# Patient Record
Sex: MALE | Race: WHITE | Employment: OTHER | URBAN - METROPOLITAN AREA
[De-identification: names, ages, dates, MRNs, and addresses within clinical notes are randomized per-mention and may not be internally consistent; named-entity substitution may affect disease eponyms.]

---

## 2017-03-13 PROBLEM — M25.511 ACUTE PAIN OF RIGHT SHOULDER: Status: ACTIVE | Noted: 2017-03-13

## 2018-01-03 PROBLEM — I35.1 AORTIC VALVE REGURGITATION: Status: ACTIVE | Noted: 2018-01-03

## 2018-01-03 PROBLEM — I20.0 UNSTABLE ANGINA (HCC): Status: ACTIVE | Noted: 2018-01-03

## 2018-01-03 PROBLEM — I20.9 ANGINA PECTORIS (HCC): Status: ACTIVE | Noted: 2018-01-03

## 2018-01-03 PROBLEM — I10 HYPERTENSION: Status: ACTIVE | Noted: 2018-01-03

## 2018-01-03 PROBLEM — I34.0 MITRAL VALVE REGURGITATION: Status: ACTIVE | Noted: 2018-01-03

## 2018-01-03 PROBLEM — Z95.5 HISTORY OF CORONARY ARTERY STENT PLACEMENT: Status: ACTIVE | Noted: 2018-01-03

## 2018-01-04 PROBLEM — M25.511 ACUTE PAIN OF RIGHT SHOULDER: Status: RESOLVED | Noted: 2017-03-13 | Resolved: 2018-01-04

## 2018-01-04 PROBLEM — D64.9 CHRONIC ANEMIA: Chronic | Status: ACTIVE | Noted: 2018-01-04

## 2018-01-04 PROBLEM — I21.4 NON-STEMI (NON-ST ELEVATED MYOCARDIAL INFARCTION) (HCC): Status: ACTIVE | Noted: 2018-01-04

## 2018-01-04 PROBLEM — N18.30 STAGE 3 CHRONIC KIDNEY DISEASE (HCC): Chronic | Status: ACTIVE | Noted: 2018-01-04

## 2018-01-04 PROBLEM — I21.4 NSTEMI (NON-ST ELEVATED MYOCARDIAL INFARCTION) (HCC): Status: ACTIVE | Noted: 2018-01-04

## 2018-01-04 PROBLEM — I20.0 UNSTABLE ANGINA (HCC): Status: RESOLVED | Noted: 2018-01-03 | Resolved: 2018-01-04

## 2018-01-05 PROBLEM — I21.4 NON-STEMI (NON-ST ELEVATED MYOCARDIAL INFARCTION) (HCC): Status: RESOLVED | Noted: 2018-01-04 | Resolved: 2018-01-05

## 2018-01-05 PROBLEM — N18.2 CKD (CHRONIC KIDNEY DISEASE) STAGE 2, GFR 60-89 ML/MIN: Chronic | Status: RESOLVED | Noted: 2018-01-05 | Resolved: 2018-01-05

## 2018-01-05 PROBLEM — Z95.5 HISTORY OF CORONARY ARTERY STENT PLACEMENT: Status: RESOLVED | Noted: 2018-01-03 | Resolved: 2018-01-05

## 2018-01-05 PROBLEM — E66.811 OBESITY (BMI 30.0-34.9): Chronic | Status: ACTIVE | Noted: 2018-01-05

## 2018-01-05 PROBLEM — I10 HYPERTENSION: Status: RESOLVED | Noted: 2018-01-03 | Resolved: 2018-01-05

## 2018-01-05 PROBLEM — E66.9 OBESITY (BMI 30.0-34.9): Chronic | Status: ACTIVE | Noted: 2018-01-05

## 2018-01-05 PROBLEM — N18.30 STAGE 3 CHRONIC KIDNEY DISEASE (HCC): Chronic | Status: RESOLVED | Noted: 2018-01-04 | Resolved: 2018-01-05

## 2018-01-05 PROBLEM — N18.30 CKD (CHRONIC KIDNEY DISEASE) STAGE 3, GFR 30-59 ML/MIN (HCC): Chronic | Status: ACTIVE | Noted: 2018-01-05

## 2018-01-05 PROBLEM — I10 ESSENTIAL HYPERTENSION: Chronic | Status: ACTIVE | Noted: 2018-01-05

## 2018-01-05 PROBLEM — E78.5 HYPERLIPIDEMIA LDL GOAL <100: Chronic | Status: ACTIVE | Noted: 2018-01-05

## 2018-01-05 PROBLEM — I25.10 CAD (CORONARY ARTERY DISEASE), NATIVE CORONARY ARTERY: Chronic | Status: ACTIVE | Noted: 2018-01-05

## 2018-01-05 PROBLEM — N18.2 CKD (CHRONIC KIDNEY DISEASE) STAGE 2, GFR 60-89 ML/MIN: Chronic | Status: ACTIVE | Noted: 2018-01-05

## 2019-09-26 ENCOUNTER — OFFICE VISIT (OUTPATIENT)
Dept: ORTHOPEDIC SURGERY | Age: 74
End: 2019-09-26
Payer: MEDICARE

## 2019-09-26 VITALS
HEIGHT: 71 IN | SYSTOLIC BLOOD PRESSURE: 136 MMHG | HEART RATE: 64 BPM | WEIGHT: 247 LBS | TEMPERATURE: 97.5 F | DIASTOLIC BLOOD PRESSURE: 78 MMHG | BODY MASS INDEX: 34.58 KG/M2

## 2019-09-26 DIAGNOSIS — S86.811A STRAIN OF CALF MUSCLE, RIGHT, INITIAL ENCOUNTER: Primary | ICD-10-CM

## 2019-09-26 DIAGNOSIS — Z96.653 HX OF TOTAL KNEE ARTHROPLASTY, BILATERAL: ICD-10-CM

## 2019-09-26 PROBLEM — S86.819A STRAIN OF CALF MUSCLE: Status: ACTIVE | Noted: 2019-09-26

## 2019-09-26 PROCEDURE — 1123F ACP DISCUSS/DSCN MKR DOCD: CPT | Performed by: ORTHOPAEDIC SURGERY

## 2019-09-26 PROCEDURE — 3017F COLORECTAL CA SCREEN DOC REV: CPT | Performed by: ORTHOPAEDIC SURGERY

## 2019-09-26 PROCEDURE — 99213 OFFICE O/P EST LOW 20 MIN: CPT | Performed by: ORTHOPAEDIC SURGERY

## 2019-09-26 PROCEDURE — G8417 CALC BMI ABV UP PARAM F/U: HCPCS | Performed by: ORTHOPAEDIC SURGERY

## 2019-09-26 PROCEDURE — 1036F TOBACCO NON-USER: CPT | Performed by: ORTHOPAEDIC SURGERY

## 2019-09-26 PROCEDURE — G8598 ASA/ANTIPLAT THER USED: HCPCS | Performed by: ORTHOPAEDIC SURGERY

## 2019-09-26 PROCEDURE — 4040F PNEUMOC VAC/ADMIN/RCVD: CPT | Performed by: ORTHOPAEDIC SURGERY

## 2019-09-26 PROCEDURE — G8427 DOCREV CUR MEDS BY ELIG CLIN: HCPCS | Performed by: ORTHOPAEDIC SURGERY

## 2019-09-26 RX ORDER — LEVOTHYROXINE SODIUM 0.05 MG/1
50 TABLET ORAL DAILY
COMMUNITY

## 2019-09-26 NOTE — PROGRESS NOTES
[facility] CARDIAC SURGERY  06/15/2001    cabg x 2, DR Nolan Hanna, SVG-RCA    COLONOSCOPY      CORONARY ANGIOPLASTY WITH STENT PLACEMENT  06/2016    LUMBAR FUSION  02/2015    L4-S1   DR. ALVARO ACUNA [facility]    MALIGNANT SKIN LESION EXCISION Left 02/28/2017    VORTEX SCALP    OTHER SURGICAL HISTORY      lumbar puncture    TOTAL KNEE ARTHROPLASTY Right 12/2007    Right TKA. Dr. Mason Rater Left 07/2010    Left TKA.     UPPER GASTROINTESTINAL ENDOSCOPY  2007       Current Outpatient Medications   Medication Sig Dispense Refill    levothyroxine (SYNTHROID) 50 MCG tablet Take 50 mcg by mouth Daily      rosuvastatin (CRESTOR) 10 MG tablet Take 10 mg by mouth daily       clopidogrel (PLAVIX) 75 MG tablet Take 1 tablet by mouth daily 30 tablet 0    gabapentin (NEURONTIN) 300 MG capsule Take 300 mg by mouth 4 times daily.  metoprolol succinate (TOPROL XL) 25 MG extended release tablet Take 25 mg by mouth every morning      FLUoxetine (PROZAC) 10 MG tablet Take 10 mg by mouth every morning       LORazepam (ATIVAN) 0.5 MG tablet Take 0.25-0.5 mg by mouth 3 times daily as needed for Anxiety .  methotrexate 2.5 MG tablet Take 15 mg by mouth once a week Monday      leucovorin (WELLCOVORIN) 5 MG tablet Take 10 mg by mouth once a week. tuesday       omeprazole (PRILOSEC) 20 MG capsule Take 20 mg by mouth every morning       aspirin 81 MG EC tablet Take 81 mg by mouth every morning        No current facility-administered medications for this visit.         Allergies   Allergen Reactions    Amoxicillin Swelling and Other (See Comments)     Weakness  Patient took Amoxicillin and Flagyl at the same time November 2017 and was unsure which medication caused his reaction    Flagyl [Metronidazole] Swelling and Other (See Comments)     Weakness  Patient took Amoxicillin and Flagyl at the same time November 2017 and was unsure which medication caused his reaction    Pcn

## 2019-12-09 ENCOUNTER — OFFICE VISIT (OUTPATIENT)
Dept: ORTHOPEDIC SURGERY | Age: 74
End: 2019-12-09
Payer: MEDICARE

## 2019-12-09 VITALS
WEIGHT: 245 LBS | TEMPERATURE: 98.4 F | DIASTOLIC BLOOD PRESSURE: 77 MMHG | SYSTOLIC BLOOD PRESSURE: 138 MMHG | HEIGHT: 71 IN | BODY MASS INDEX: 34.3 KG/M2 | HEART RATE: 65 BPM

## 2019-12-09 DIAGNOSIS — M25.562 LEFT KNEE PAIN, UNSPECIFIED CHRONICITY: ICD-10-CM

## 2019-12-09 DIAGNOSIS — Z96.653 HX OF TOTAL KNEE ARTHROPLASTY, BILATERAL: Primary | ICD-10-CM

## 2019-12-09 DIAGNOSIS — M67.432 GANGLION CYST OF VOLAR ASPECT OF LEFT WRIST: ICD-10-CM

## 2019-12-09 PROCEDURE — G8598 ASA/ANTIPLAT THER USED: HCPCS | Performed by: ORTHOPAEDIC SURGERY

## 2019-12-09 PROCEDURE — 1036F TOBACCO NON-USER: CPT | Performed by: ORTHOPAEDIC SURGERY

## 2019-12-09 PROCEDURE — G8427 DOCREV CUR MEDS BY ELIG CLIN: HCPCS | Performed by: ORTHOPAEDIC SURGERY

## 2019-12-09 PROCEDURE — 4040F PNEUMOC VAC/ADMIN/RCVD: CPT | Performed by: ORTHOPAEDIC SURGERY

## 2019-12-09 PROCEDURE — 99214 OFFICE O/P EST MOD 30 MIN: CPT | Performed by: ORTHOPAEDIC SURGERY

## 2019-12-09 PROCEDURE — 3017F COLORECTAL CA SCREEN DOC REV: CPT | Performed by: ORTHOPAEDIC SURGERY

## 2019-12-09 PROCEDURE — 20612 ASPIRATE/INJ GANGLION CYST: CPT | Performed by: ORTHOPAEDIC SURGERY

## 2019-12-09 PROCEDURE — G8484 FLU IMMUNIZE NO ADMIN: HCPCS | Performed by: ORTHOPAEDIC SURGERY

## 2019-12-09 PROCEDURE — G8417 CALC BMI ABV UP PARAM F/U: HCPCS | Performed by: ORTHOPAEDIC SURGERY

## 2019-12-09 PROCEDURE — 1123F ACP DISCUSS/DSCN MKR DOCD: CPT | Performed by: ORTHOPAEDIC SURGERY

## 2019-12-09 RX ORDER — FLUOROURACIL 50 MG/G
CREAM TOPICAL
Refills: 0 | COMMUNITY
Start: 2019-11-22 | End: 2022-04-18 | Stop reason: ALTCHOICE

## 2020-01-20 ENCOUNTER — OFFICE VISIT (OUTPATIENT)
Dept: ORTHOPEDIC SURGERY | Age: 75
End: 2020-01-20
Payer: MEDICARE

## 2020-01-20 VITALS — BODY MASS INDEX: 34.3 KG/M2 | HEIGHT: 71 IN | WEIGHT: 245 LBS

## 2020-01-20 PROCEDURE — 3017F COLORECTAL CA SCREEN DOC REV: CPT | Performed by: ORTHOPAEDIC SURGERY

## 2020-01-20 PROCEDURE — G8427 DOCREV CUR MEDS BY ELIG CLIN: HCPCS | Performed by: ORTHOPAEDIC SURGERY

## 2020-01-20 PROCEDURE — G8484 FLU IMMUNIZE NO ADMIN: HCPCS | Performed by: ORTHOPAEDIC SURGERY

## 2020-01-20 PROCEDURE — 4040F PNEUMOC VAC/ADMIN/RCVD: CPT | Performed by: ORTHOPAEDIC SURGERY

## 2020-01-20 PROCEDURE — 99213 OFFICE O/P EST LOW 20 MIN: CPT | Performed by: ORTHOPAEDIC SURGERY

## 2020-01-20 PROCEDURE — 1123F ACP DISCUSS/DSCN MKR DOCD: CPT | Performed by: ORTHOPAEDIC SURGERY

## 2020-01-20 PROCEDURE — 1036F TOBACCO NON-USER: CPT | Performed by: ORTHOPAEDIC SURGERY

## 2020-01-20 PROCEDURE — G8417 CALC BMI ABV UP PARAM F/U: HCPCS | Performed by: ORTHOPAEDIC SURGERY

## 2020-01-20 NOTE — PROGRESS NOTES
Chief Complaint:   Chief Complaint   Patient presents with    Knee Pain     F/u left knee. Patient continues to have pain behind left knee, pain is radiating into calf. HPI   Follow-up left posterior knee and calf pain with walking. Again patient has a history of total knee arthroplasty by Dr. Taj Saleh. Patient does not describe pain that occurs with walking distances to suggest claudication and seems more with direct exertion. He has been trying home exercises, has not had physical therapy. Patient Active Problem List   Diagnosis    Mitral valve regurgitation    Aortic valve regurgitation    NSTEMI [facility]non-ST elevated [facility] infarction) [facility]    Chronic anemia    Hyperlipidemia LDL goal <100    CAD (coronary artery disease), native coronary artery    Essential hypertension    CKD (chronic kidney disease) stage 3, GFR 30-59 ml/min ([facility])    Obesity (BMI 30.0-34. 9)    Hx of total knee arthroplasty, bilateral    Strain of calf muscle       Past Medical History:   Diagnosis Date    Anxiety     Arthritis     rheumatoid arthritis    ASHD (arteriosclerotic heart disease)     Chronic anemia     Chronic kidney disease, stage III (moderate) ([facility])     Depression     GERD (gastroesophageal reflux disease)     Hyperlipidemia     MI [facility]myocardial infarction) [facility] [address]     NSTEMI 7/2000    Other ulcerative colitis     BY BIOPSY    Psoriatic arthritis ([facility] [address]     Sleep apnea        Past Surgical History:   Procedure Laterality Date    [facility] Kwon. [facility]    CARDIAC CATHETERIZATION  01/04/2018    Dr. Elvia Jaquez  06/15/2001    cabg x 2, DR Michel Silverio, SVG-RCA    COLONOSCOPY      CORONARY ANGIOPLASTY WITH STENT PLACEMENT  06/2016    LUMBAR FUSION  02/2015    L4-S1   DR. ALVARO ACUNA [facility]    MALIGNANT SKIN LESION EXCISION Left 02/28/2017    VORTEX SCALP    OTHER SURGICAL HISTORY      lumbar puncture    TOTAL KNEE ARTHROPLASTY Right 12/2007    Right TKA. Dr. Albania Patino Left 07/2010    Left TKA.     UPPER GASTROINTESTINAL ENDOSCOPY  2007       Current Outpatient Medications   Medication Sig Dispense Refill    fluorouracil (EFUDEX) 5 % cream APPLY TO AFFECTED AREA TWICE A DAY [id number]  0    levothyroxine (SYNTHROID) 50 MCG tablet Take 50 mcg by mouth Daily      rosuvastatin (CRESTOR) 10 MG tablet Take 10 mg by mouth daily       clopidogrel (PLAVIX) 75 MG tablet Take 1 tablet by mouth daily 30 tablet 0    gabapentin (NEURONTIN) 300 MG capsule Take 300 mg by mouth 4 times daily.  metoprolol succinate (TOPROL XL) 25 MG extended release tablet Take 25 mg by mouth every morning      FLUoxetine (PROZAC) 10 MG tablet Take 10 mg by mouth every morning       LORazepam (ATIVAN) 0.5 MG tablet Take 0.25-0.5 mg by mouth 3 times daily as needed for Anxiety .  methotrexate 2.5 MG tablet Take 15 mg by mouth once a week Monday      leucovorin (WELLCOVORIN) 5 MG tablet Take 10 mg by mouth once a week. tuesday       omeprazole (PRILOSEC) 20 MG capsule Take 20 mg by mouth every morning       aspirin 81 MG EC tablet Take 81 mg by mouth every morning        No current facility-administered medications for this visit.         Allergies   Allergen Reactions    Amoxicillin Swelling and Other (See Comments)     Weakness  Patient took Amoxicillin and Flagyl at the same time November 2017 and was unsure which medication caused his reaction    Flagyl [Metronidazole] Swelling and Other (See Comments)     Weakness  Patient took Amoxicillin and Flagyl at the same time November 2017 and was unsure which medication caused his reaction    Pcn [Penicillins] Swelling       Social History     Socioeconomic History    Marital status:      Spouse name: None    Number of children: None    Years of education: None    Highest education level: None   Occupational History    Occupation: retired- manager     Comment: [employer]

## 2020-11-23 PROBLEM — I25.10 ARTERIOSCLEROSIS OF CORONARY ARTERY: Status: ACTIVE | Noted: 2018-01-05

## 2021-04-28 ENCOUNTER — OFFICE VISIT (OUTPATIENT)
Dept: ORTHOPEDIC SURGERY | Age: 76
End: 2021-04-28
Payer: MEDICARE

## 2021-04-28 VITALS — BODY MASS INDEX: 31.83 KG/M2 | WEIGHT: 235 LBS | HEIGHT: 72 IN

## 2021-04-28 DIAGNOSIS — M75.121 COMPLETE TEAR OF RIGHT ROTATOR CUFF, UNSPECIFIED WHETHER TRAUMATIC: ICD-10-CM

## 2021-04-28 DIAGNOSIS — M75.41 IMPINGEMENT SYNDROME OF RIGHT SHOULDER: ICD-10-CM

## 2021-04-28 DIAGNOSIS — S70.01XA CONTUSION OF RIGHT HIP, INITIAL ENCOUNTER: ICD-10-CM

## 2021-04-28 DIAGNOSIS — S49.91XA SHOULDER INJURY, RIGHT, INITIAL ENCOUNTER: Primary | ICD-10-CM

## 2021-04-28 PROCEDURE — 1123F ACP DISCUSS/DSCN MKR DOCD: CPT | Performed by: ORTHOPAEDIC SURGERY

## 2021-04-28 PROCEDURE — 20610 DRAIN/INJ JOINT/BURSA W/O US: CPT | Performed by: ORTHOPAEDIC SURGERY

## 2021-04-28 PROCEDURE — 4040F PNEUMOC VAC/ADMIN/RCVD: CPT | Performed by: ORTHOPAEDIC SURGERY

## 2021-04-28 PROCEDURE — G8427 DOCREV CUR MEDS BY ELIG CLIN: HCPCS | Performed by: ORTHOPAEDIC SURGERY

## 2021-04-28 PROCEDURE — 1036F TOBACCO NON-USER: CPT | Performed by: ORTHOPAEDIC SURGERY

## 2021-04-28 PROCEDURE — G8417 CALC BMI ABV UP PARAM F/U: HCPCS | Performed by: ORTHOPAEDIC SURGERY

## 2021-04-28 PROCEDURE — 99213 OFFICE O/P EST LOW 20 MIN: CPT | Performed by: ORTHOPAEDIC SURGERY

## 2021-04-28 RX ORDER — BUPIVACAINE HYDROCHLORIDE 2.5 MG/ML
2 INJECTION, SOLUTION INFILTRATION; PERINEURAL ONCE
Status: COMPLETED | OUTPATIENT
Start: 2021-04-28 | End: 2021-04-28

## 2021-04-28 RX ORDER — TRIAMCINOLONE ACETONIDE 40 MG/ML
40 INJECTION, SUSPENSION INTRA-ARTICULAR; INTRAMUSCULAR ONCE
Status: COMPLETED | OUTPATIENT
Start: 2021-04-28 | End: 2021-04-28

## 2021-04-28 RX ORDER — ZINC GLUCONATE 50 MG
50 TABLET ORAL DAILY
COMMUNITY

## 2021-04-28 RX ORDER — ACETAMINOPHEN/DIPHENHYDRAMINE 500MG-25MG
2 TABLET ORAL NIGHTLY PRN
COMMUNITY

## 2021-04-28 RX ADMIN — TRIAMCINOLONE ACETONIDE 40 MG: 40 INJECTION, SUSPENSION INTRA-ARTICULAR; INTRAMUSCULAR at 15:14

## 2021-04-28 RX ADMIN — BUPIVACAINE HYDROCHLORIDE 5 MG: 2.5 INJECTION, SOLUTION INFILTRATION; PERINEURAL at 15:14

## 2021-04-28 NOTE — PROGRESS NOTES
Chief Complaint:   Chief Complaint   Patient presents with    Shoulder Injury     Rt shoulder injury 4/15/2021. No X-rays  Tripped on uneven ground, fell on right side, landing in gravel injurying Rt shoulder    Hip Injury     Injured right hip in same fall. No X-rays       HPI 65-year-old man fell while on vacation in the [facility] 4/15/2021 onto his right shoulder and right hip. Did not have too much hip pain but noticed a large area of bruising on the lateral aspect of the right hip. He has had some persistent right shoulder pain difficulty elevating the arm pain at night. He was evaluated in the past with shoulder x-rays in 2017 and had physical therapy for it. Patient Active Problem List   Diagnosis    Mitral valve regurgitation    Aortic valve regurgitation    NSTEMI (non-ST elevated myocardial infarction) (H[facility])    Chronic anemia    Hyperlipidemia LDL goal <100    Arteriosclerosis of coronary artery    Essential hypertension    CKD (chronic kidney disease) stage 3, GFR 30-59 ml/min    Obesity (BMI 30.0-34. 9)    Hx of total knee arthroplasty, bilateral    Strain of calf muscle       Past Medical History:   Diagnosis Date    Anxiety     Arthritis     rheumatoid arthritis    ASHD (arteriosclerotic heart disease)     Chronic anemia     Chronic kidney disease, stage III (moderate)     Depression     GERD (gastroesophageal reflux disease)     Hyperlipidemia     MI (myocardial infarction) ([address]     NSTEMI 7/2000    Other ulcerative colitis     BY BIOPSY    Psoriatic arthritis ([address]     Sleep apnea        Past Surgical History:   Procedure Laterality Date    Phillip Painter. [facility]    CARDIAC CATHETERIZATION  01/04/2018    Dr. Veronica Mckay  06/15/2001    cabg x 2, DR Aj Miramontes, SVG-RCA    COLONOSCOPY      CORONARY ANGIOPLASTY WITH STENT PLACEMENT  06/2016    LUMBAR FUSION  02/2015    L4-S1   [facility] Amox[facility] and Flagyl at the same time 2017 and was unsure which medication caused his reaction    Pcn [Penicillins] Swelling       Social History     Socioeconomic History    Marital status:      Spouse name: None    Number of children: None    Years of education: None    Highest education level: None   Occupational History    Occupation: retired- manager     Comment: [email or web address]   Social Needs    Financial resource strain: None    Food insecurity     Worry: None     Inability: None    Transportation needs     Medical: None     Non-medical: None   Tobacco Use    Smoking status: Former Smoker     Packs/day: 1.00     Years: 15.00     Pack years: 15.00     Types: Cigarettes     Start date:      Quit date: 1978     Years since quittin.11    Smokeless tobacco: Never Used   Substance and Sexual Activity    Alcohol use: Yes     Alcohol/week: 5.0 standard drinks     Types: 5 Shots of liquor per week     Comment: 1-2 beers a day    Drug use: No    Sexual activity: Not Currently   Lifestyle    Physical activity     Days per week: None     Minutes per session: None    Stress: None   Relationships    Social connections     Talks on phone: None     Gets together: None     Attends Roman [employer] service: None     Active member of club or organization: None     Attends meetings of clubs or organizations: None     Relationship status: None    Intimate partner violence     Fear of current or ex partner: None     Emotionally abused: None     Physically abused: None     Forced sexual activity: None   Other Topics Concern    None   Social History Narrative    None       Family History   Problem Relation Age of Onset    Other Mother         sepsis    Diabetes Father     Heart Disease Father          Review of Systems   No fever, chills, or other constitutionalsymptoms. No numbness or other neuro symptoms. No chest pain. No dyspnea. [unfilled]   No acute distress. Stands and walks independently. No limp. Negative Trendelenburg test.  No pain on right hip rotation. The lateral aspect of the right hip shows no residual ecchymosis no tenderness no palpable fluid accumulation. Right shoulder has some supraspinatus atrophy evident. Some tenderness over the acromion. He can flex to 95 degrees, abduct to 90 degrees, internal rotation to the posterior ilium. Supraspinatus strength testing 4/5. Physical Exam    Patient is alert and oriented. Well-developed well-nourished. Pupils equal and reactive. Scleraeanicteric. Neck supple  Lungs clear. Cardiac rate and rhythm regular. Abdomen soft and nontender. Skin warm and dry. XRAY: X-ray today AP and lateral views right shoulder. Mild glenohumeral degenerative changes. There is significant narrowing of the subacromial index with acromioclavicular degenerative changes. Slight cystic changes in the greater tuberosity of the right humerus. 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 ASSESSMENT/PLAN:    Sravan Solano was seen today for shoulder injury and hip injury. Diagnoses and all orders for this visit:    Shoulder injury, right, initial encounter  -     XR SHOULDER RIGHT (MIN 2 VIEWS); Future    Contusion of right hip, initial encounter    Complete tear of right rotator cuff, unspecified whether traumatic  -     Amb External Referral To Physical Therapy    Impingement syndrome of right shoulder  -     CO ARTHROCENTESIS ASPIR&/INJ MAJOR JT/BURSA W/O US    Other orders  -     triamcinolone acetonide (KENALOG-40) injection 40 mg  -     bupivacaine (MARCAINE) 0.25 % injection 5 mg    Findings and images reviewed with the patient. His cuff tear is likely chronic. Suggested he resume physical therapy and also offered steroid injection which she wished to have today. The right hip appears to have resolved contusion with no clinical evidence to suggest fracture.     Treatment options, risks and limits explained. After alcohol prep, right subacromial shoulder injection with triamcinolone 40mg and 0.25% marcaine 3ml  given today. Well tolerated. Return in about 6 weeks (around 6/9/2021).        Alyssa Calhoun MD    4/28/2021  2:57 PM

## 2022-02-24 ENCOUNTER — HOSPITAL ENCOUNTER (OUTPATIENT)
Age: 77
Discharge: HOME | End: 2022-02-26

## 2022-02-24 PROCEDURE — 88305 TISSUE EXAM BY PATHOLOGIST: CPT

## 2022-04-17 ENCOUNTER — APPOINTMENT (OUTPATIENT)
Dept: ULTRASOUND IMAGING | Age: 77
End: 2022-04-17
Payer: MEDICARE

## 2022-04-17 ENCOUNTER — HOSPITAL ENCOUNTER (EMERGENCY)
Age: 77
Discharge: ANOTHER HOSPITAL | End: 2022-04-18
Attending: STUDENT IN AN ORGANIZED HEALTH CARE EDUCATION/TRAINING PROGRAM
Payer: MEDICARE

## 2022-04-17 ENCOUNTER — APPOINTMENT (OUTPATIENT)
Dept: CT IMAGING | Age: 77
End: 2022-04-17
Payer: MEDICARE

## 2022-04-17 VITALS
TEMPERATURE: 97.9 F | BODY MASS INDEX: 32.51 KG/M2 | DIASTOLIC BLOOD PRESSURE: 70 MMHG | HEART RATE: 65 BPM | HEIGHT: 72 IN | OXYGEN SATURATION: 95 % | WEIGHT: 240 LBS | SYSTOLIC BLOOD PRESSURE: 126 MMHG | RESPIRATION RATE: 16 BRPM

## 2022-04-17 DIAGNOSIS — T14.8XXA HEMATOMA: Primary | ICD-10-CM

## 2022-04-17 LAB
ANION GAP SERPL CALCULATED.3IONS-SCNC: 8 MMOL/L (ref 7–16)
APTT: 28.4 SEC (ref 24.5–35.1)
ATYPICAL LYMPHOCYTE RELATIVE PERCENT: 0.9 % (ref 0–4)
BASOPHILS ABSOLUTE: 0 E9/L (ref 0–0.2)
BASOPHILS RELATIVE PERCENT: 0 % (ref 0–2)
BUN BLDV-MCNC: 30 MG/DL (ref 6–23)
C-REACTIVE PROTEIN: 0.3 MG/DL (ref 0–0.4)
CALCIUM SERPL-MCNC: 9.5 MG/DL (ref 8.6–10.2)
CHLORIDE BLD-SCNC: 108 MMOL/L (ref 98–107)
CO2: 27 MMOL/L (ref 22–29)
CREAT SERPL-MCNC: 1.5 MG/DL (ref 0.7–1.2)
EOSINOPHILS ABSOLUTE: 0.4 E9/L (ref 0.05–0.5)
EOSINOPHILS RELATIVE PERCENT: 5.3 % (ref 0–6)
GFR AFRICAN AMERICAN: 55
GFR NON-AFRICAN AMERICAN: 45 ML/MIN/1.73
GLUCOSE BLD-MCNC: 96 MG/DL (ref 74–99)
HCT VFR BLD CALC: 37.6 % (ref 37–54)
HEMOGLOBIN: 12.5 G/DL (ref 12.5–16.5)
INR BLD: 1.1
LYMPHOCYTES ABSOLUTE: 0.68 E9/L (ref 1.5–4)
LYMPHOCYTES RELATIVE PERCENT: 7.9 % (ref 20–42)
MCH RBC QN AUTO: 34.7 PG (ref 26–35)
MCHC RBC AUTO-ENTMCNC: 33.2 % (ref 32–34.5)
MCV RBC AUTO: 104.4 FL (ref 80–99.9)
MONOCYTES ABSOLUTE: 0.6 E9/L (ref 0.1–0.95)
MONOCYTES RELATIVE PERCENT: 8 % (ref 2–12)
NEUTROPHILS ABSOLUTE: 5.85 E9/L (ref 1.8–7.3)
NEUTROPHILS RELATIVE PERCENT: 77.9 % (ref 43–80)
NUCLEATED RED BLOOD CELLS: 0 /100 WBC
PDW BLD-RTO: 13.7 FL (ref 11.5–15)
PLATELET # BLD: 131 E9/L (ref 130–450)
PMV BLD AUTO: 11.3 FL (ref 7–12)
POTASSIUM SERPL-SCNC: 4.4 MMOL/L (ref 3.5–5)
PROTHROMBIN TIME: 11.9 SEC (ref 9.3–12.4)
RBC # BLD: 3.6 E12/L (ref 3.8–5.8)
SEDIMENTATION RATE, ERYTHROCYTE: 8 MM/HR (ref 0–15)
SODIUM BLD-SCNC: 143 MMOL/L (ref 132–146)
WBC # BLD: 7.5 E9/L (ref 4.5–11.5)

## 2022-04-17 PROCEDURE — 86140 C-REACTIVE PROTEIN: CPT

## 2022-04-17 PROCEDURE — 85025 COMPLETE CBC W/AUTO DIFF WBC: CPT

## 2022-04-17 PROCEDURE — 96374 THER/PROPH/DIAG INJ IV PUSH: CPT

## 2022-04-17 PROCEDURE — 96376 TX/PRO/DX INJ SAME DRUG ADON: CPT

## 2022-04-17 PROCEDURE — 80048 BASIC METABOLIC PNL TOTAL CA: CPT

## 2022-04-17 PROCEDURE — 85651 RBC SED RATE NONAUTOMATED: CPT

## 2022-04-17 PROCEDURE — 85610 PROTHROMBIN TIME: CPT

## 2022-04-17 PROCEDURE — 99283 EMERGENCY DEPT VISIT LOW MDM: CPT

## 2022-04-17 PROCEDURE — 75635 CT ANGIO ABDOMINAL ARTERIES: CPT

## 2022-04-17 PROCEDURE — 6360000004 HC RX CONTRAST MEDICATION: Performed by: RADIOLOGY

## 2022-04-17 PROCEDURE — 85730 THROMBOPLASTIN TIME PARTIAL: CPT

## 2022-04-17 PROCEDURE — 93971 EXTREMITY STUDY: CPT

## 2022-04-17 PROCEDURE — 6360000002 HC RX W HCPCS: Performed by: STUDENT IN AN ORGANIZED HEALTH CARE EDUCATION/TRAINING PROGRAM

## 2022-04-17 RX ADMIN — HYDROMORPHONE HYDROCHLORIDE 0.5 MG: 1 INJECTION, SOLUTION INTRAMUSCULAR; INTRAVENOUS; SUBCUTANEOUS at 18:44

## 2022-04-17 RX ADMIN — HYDROMORPHONE HYDROCHLORIDE 1 MG: 1 INJECTION, SOLUTION INTRAMUSCULAR; INTRAVENOUS; SUBCUTANEOUS at 22:58

## 2022-04-17 RX ADMIN — IOPAMIDOL 120 ML: 755 INJECTION, SOLUTION INTRAVENOUS at 18:41

## 2022-04-17 ASSESSMENT — PAIN SCALES - GENERAL
PAINLEVEL_OUTOF10: 7
PAINLEVEL_OUTOF10: 7

## 2022-04-17 NOTE — ED PROVIDER NOTES
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 This started about a week ago. Is gradually getting worse. He has severe pain in the calf. He has numbness and tingling throughout his calf as well as his upper thigh which is chronic due to low back pain and radicular symptomatology. The patient states that today the Pain became severe. He is concerned to potentially have a DVT. He is on Plavix. He denies any numbness or paresthesias in the foot however. Denies any fever chills or myalgias. Nothing symptoms better or worse they are constant duration. He states that he was sitting there today and noted that his calf was very swollen and painful and figured he could have a blood clot so he presents for evaluation. He states that about a week to a week and a half ago he actually twisted on his calf and he felt a pop in his popliteal region. He states he felt some tension at that time but it was minimal and inconsequential per his report and he ignored it. He is unsure if this is related. Does have a history of surgeries in the left Achilles tendon region but none recently. He also has a left TKA that he reports.       Review of Systems:  Review of systems obtained and negative unless stated otherwise above in the HPI.    --------------------------------------------- PAST HISTORY ---------------------------------------------  Past Medical History:  has a past medical history of Anxiety, Arthritis, ASHD (arteriosclerotic heart disease), Chronic anemia, Chronic kidney disease, stage III (moderate) ([facility] [address] Depression, GERD (gastroesophageal reflux disease), Hyperlipidemia, MI (myocardial infarction) ([facility] [address] Other ulcerative colitis, Psoriatic arthritis ([facility] [address] and Sleep apnea. Past Surgical History:  has a past surgical history that includes Cardiac surgery (06/15/2001); other surgical history; Achilles tendon surgery (Left, 1998); Coronary angioplasty with stent (06/2016); Colonoscopy; Upper gastrointestinal endoscopy (2007); malignant skin lesion excision (Left, 02/28/2017); Cardiac catheterization (01/04/2018); Total knee arthroplasty (Right, 12/2007); Total knee arthroplasty (Left, 07/2010); and lumbar fusion (02/2015). Social History:  reports that he quit smoking about 43 years ago. His smoking use included cigarettes. He started smoking about 59 years ago. He has a 15.00 pack-year smoking history. He has never used smokeless tobacco. He reports current alcohol use of about 5.0 standard drinks of alcohol per week. He reports that he does not use drugs. Family History: family history includes Diabetes in his father; Heart Disease in his father; Other in his mother. . Unless otherwise noted, family history is non contributory    The patients home medications have been reviewed. Allergies: Amoxicillin, Flagyl [metronidazole], and Pcn [penicillins]    I have reviewed the past medical history, past surgical history, social history, and family history    ---------------------------------------------------PHYSICAL EXAM--------------------------------------    Constitutional: Appears in no distress  Head: Normocephalic, atraumatic  Eyes: Non-icteric slcera, no conjunctival injection  ENT: Moist mucous membranes,  Neck: Trachea midline, no JVD  Respiratory: Nonlabored respirations. Lungs clear to auscultation bilaterally, no wheezes, rales, or rhonchi. Cardiovascular: Regular rate. Regular rhythm. No murmurs, no gallops, no rubs. Gastrointestinal: Abdomen Soft, Non tender, Non distended. No rebound tenderness, guarding, or rigidity.   Extremities: No lower extremity edema  Genitourinary: No CVA tenderness, no suprapubic tenderness  Musculoskeletal: Moves all extremities, no deformity, there is calf tenderness, significant swelling as compared to the right side, there is minimal to no erythema, there is postsurgical changes along the left Achilles, I am unable to palpate a DP or posterior tibial pulse bilaterally manually. The calf is tense and not easily compressible, there is no pallor, tony thermia of the left lower or right lower extremity. No evidence of overlying cellulitis. I am able to Doppler a left posterior tibial pulse as well as the right posterior tibial pulse that appear equal.  I am unable to locate bilateral dorsalis pedis pulses via Doppler however. There is no pain with passive range of motion but does have some pain with active range of motion in plantarflexion and dorsiflexion of the foot on the left. Skin: Pink, warm, dry without rash. Neurologic: Alert, symmetric facies, no aphasia    -------------------------------------------------- RESULTS -------------------------------------------------  I have personally reviewed all laboratory and imaging results for this patient. Results are listed below.      LABS: (Lab results interpreted by me)  Results for orders placed or performed during the hospital encounter of 98/11/21   Basic Metabolic Panel   Result Value Ref Range    Sodium 143 132 - 146 mmol/L    Potassium 4.4 3.5 - 5.0 mmol/L    Chloride 108 (H) 98 - 107 mmol/L    CO2 27 22 - 29 mmol/L    Anion Gap 8 7 - 16 mmol/L    Glucose 96 74 - 99 mg/dL    BUN 30 (H) 6 - 23 mg/dL    CREATININE 1.5 (H) 0.7 - 1.2 mg/dL    GFR Non-African American 45 >=60 mL/min/1.73    GFR African American 55     Calcium 9.5 8.6 - 10.2 mg/dL   CBC with Auto Differential   Result Value Ref Range    WBC 7.5 4.5 - 11.5 E9/L    RBC 3.60 (L) 3.80 - 5.80 E12/L    Hemoglobin 12.5 12.5 - 16.5 g/dL    Hematocrit 37.6 37.0 - 54.0 %    .4 (H) 80.0 - 99.9 fL    MCH 34.7 26.0 - 35.0 pg    MCHC 33.2 32.0 - 34.5 %    RDW 13.7 11.5 - 15.0 fL    Platelets 480 431 - 768 E9/L    MPV 11.3 7.0 - 12.0 fL    Neutrophils % 77.9 43.0 - 80.0 %    Lymphocytes % 7.9 (L) 20.0 - 42.0 %    Monocytes % 8.0 2.0 - 12.0 %    Eosinophils % 5.3 0.0 - 6.0 %    Basophils % 0.0 0.0 - 2.0 %    Neutrophils Absolute 5.85 1.80 - 7.30 E9/L    Lymphocytes Absolute 0.68 (L) 1.50 - 4.00 E9/L    Monocytes Absolute 0.60 0.10 - 0.95 E9/L    Eosinophils Absolute 0.40 0.05 - 0.50 E9/L    Basophils Absolute 0.00 0.00 - 0.20 E9/L    Atypical Lymphocytes Relative 0.9 0.0 - 4.0 %    nRBC 0.0 /100 WBC   APTT   Result Value Ref Range    aPTT 28.4 24.5 - 35.1 sec   Protime-INR   Result Value Ref Range    Protime 11.9 9.3 - 12.4 sec    INR 1.1    Sedimentation Rate   Result Value Ref Range    Sed Rate 8 0 - 15 mm/Hr   C-Reactive Protein   Result Value Ref Range    CRP 0.3 0.0 - 0.4 mg/dL   ,       RADIOLOGY:  Interpreted by Radiologist unless otherwise specified  CTA ABDOMINAL AORTA W BILAT RUNOFF W CONTRAST   Final Result   1. Findings consistent with left calf hematoma and subcutaneous edema. 2   vessel arterial runoff into the foot appears to be present with anterior   tibial artery occluded   2. On the right the calf arteries are not well assessed probably at least the   anterior tibial artery is occluded otherwise indeterminate as described above   3. No acute arterial abnormality extending from abdominal aorta through iliac   and femoropopliteal arteries as able to be visualized, with atherosclerotic   disease noted         US DUP LOWER EXTREMITY LEFT DANIS   Final Result   No evidence of DVT in the left lower extremity. Large posterior calf subcutaneous heterogeneous fluid collection measuring   17.1 x 3.3 x 5.7 cm, most suggestive of hematoma.                ------------------------- NURSING NOTES AND VITALS REVIEWED ---------------------------   The nursing notes within the ED encounter and vital signs as below have been reviewed by myself  BP (!) 147/69   Pulse 66   Temp 97.9 °F (36.6 °C) (Temporal)   Resp 14   Ht 6' (1.829 m)   Wt 240 lb (108.9 kg)   SpO2 96%   BMI 32.55 kg/m²     Oxygen Saturation Interpretation: Normal    The patients available past medical records and past encounters were reviewed. ------------------------------ ED COURSE/MEDICAL DECISION MAKING----------------------  Medications   HYDROmorphone (DILAUDID) injection 0.5 mg (0.5 mg IntraVENous Given 4/17/22 1844)   iopamidol (ISOVUE-370) 76 % injection 120 mL (120 mLs IntraVENous Given 4/17/22 1841)        Re-Evaluations: This patient's ED course included:a personal history and physicial examination, re-evaluation prior to disposition, multiple bedside re-evaluations and IV medications    This patient has remained hemodynamically stable during their ED course. Consultations:  Spoke with surgery and vascular surgery    Medical Decision Making:   Patient presents emerge department with left lower extremity pain and Swelling. Vital signs interpreted by myself as hypertensive otherwise normal.    History and physical examination findings consistent with possible compartment syndrome, hematoma, DVT. Work-up was ensued for this. Ultrasound tech and I did speak and there is a very large hematoma of the left calf region. She is unable to visualize the popliteal vessel to truly rule out a DVT however there is no DVT noted and the vessels distal to this. I did order a stat CTA with runoff to assess the vasculature and their patency as well as further assess the hematoma in the left calf    Patient is a mild acute kidney injury. Labs are reviewed the patient has no anemia noted. CTA is remarkable for left calf hematoma subcutaneous edema. 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 However there is possible right anterior tibial artery occlusion as well. On reassessment the patient has pain localized to his left calf.   I am concerned for the possibility of a gradually developing compartment syndrome. However, given the hematoma we will forego obtaining a compartment pressure at this time. I do have concern for the possibility of a progressive and worsening hematoma that may require evacuation or surgical evaluation. I spoke with general surgery and they feel this would be more related to the vascular surgery specialty. I spoke with Dr. Ashley Sanchez who states he will be happy to be on consult but the vascular occlusion looks chronic. Orthopedic surgery will likely need to evaluate as the patient may need evacuation subsequently from the fascial compartment. Patient be [facility] to [facility] for higher level of care and specialist evaluation    Counseling: The emergency provider has spoken with the patient and discussed todays results, in addition to providing specific details for the plan of care and counseling regarding the diagnosis and prognosis. Questions are answered at this time and they are agreeable with the plan.       --------------------------------- IMPRESSION AND DISPOSITION ---------------------------------    IMPRESSION  1. Hematoma        DISPOSITION  Disposition: Transfer to [facility]  Patient condition is stable    I, Dr. Brooke Pena, am the primary provider of record    Brooke Pena DO  Emergency Medicine    NOTE: This report was transcribed using voice recognition software.  Every effort was made to ensure accuracy; however, inadvertent computerized transcription errors may be present         Alma Landeros DO  04/17/22 2127

## 2022-04-17 NOTE — ED PROVIDER NOTES
FIRST PROVIDER CONTACT ASSESSMENT NOTE           Department of Emergency Medicine                 First Provider Note            22  5:03 PM EDT    Date of Encounter: No admission date for patient encounter. Patient Name: Alexi Mo  : 1945  MRN: [id number]    Chief Complaint: Leg Pain (left knee pain started today) and Leg Swelling (warm to touch)      History of Present Illness:   Alexi Mo is a 65 y.o. male who presents to the ED for left leg swelling. Onset today. States he has had some back pain and tingling in leg. Swelling new. No CP or SOB. Denies injury or travel. No hx of DVT       Focused Physical Exam:  VS:    ED Triage Vitals   BP Temp Temp src Pulse Resp SpO2 Height Weight   -- -- -- -- -- -- -- --        Physical Ex: Constitutional: Alert and non-toxic. Medical History:  has a past medical history of Anxiety, Arthritis, ASHD (arteriosclerotic heart disease), Chronic anemia, Chronic kidney disease, stage III (moderate) ([address] Depression, GERD (gastroesophageal reflux disease), Hyperlipidemia, MI (myocardial infarction) ([address] Other ulcerative colitis, Psoriatic arthritis ([address] and Sleep apnea. Surgical History:  has a past surgical history that includes Cardiac surgery (06/15/2001); other surgical history; Achilles tendon surgery (Left, ); Coronary angioplasty with stent (2016); Colonoscopy; Upper gastrointestinal endoscopy (); malignant skin lesion excision (Left, 2017); Cardiac catheterization (2018); Total knee arthroplasty (Right, 2007); Total knee arthroplasty (Left, 2010); and lumbar fusion (2015). Social History:  reports that he quit smoking about 43 years ago. His smoking use included cigarettes. He started smoking about 59 years ago. He has a 15.00 pack-year smoking history. He has never used smokeless tobacco. He reports current alcohol use of about 5.0 standard drinks of alcohol per week.  He reports that he does not use drugs. Family History: family history includes Diabetes in his father; Heart Disease in his father; Other in his mother.     Allergies: Amoxicillin, Flagyl [metronidazole], and Pcn [penicillins]     Initial Plan of Care: Initiate Treatment-Testing, Proceed toTreatment Area When Bed Available for ED Attending/MLP to Continue Care      ---END OF FIRST PROVIDER CONTACT ASSESSMENT NOTE---  Electronically signed by Pato Darling PA-C   DD: 4/17/22       Pato Darling PA-C  04/17/22 1709

## 2022-04-17 NOTE — ED NOTES
Radiology Procedure Waiver   Name: Leslye Maki Doctor  : 1945  MRN: [id number]    Date:  22    Time: 6:14 PM EDT    Benefits of immediately proceeding with Radiology exam(s) without pre-testing outweigh the risks or are not indicated as specified below and therefore the following is/are being waived:    [] Pregnancy test   [] Patients LMP on-time and regular.   [] Patient had Tubal Ligation or has other Contraception Device. [] Patient  is Menopausal or Premenarcheal.    [] Patient had Full or Partial Hysterectomy. [] Protocol for Iodine allergy    [] MRI Questionnaire     [x] BUN/Creatinine   [] Patient age w/no hx of renal dysfunction. [] Patient on Dialysis. [] Recent Normal Labs.   Electronically signed by Bogdan Britton DO on 22 at [address] PM EDT               Demario Lazaro DO  22 [id number]

## 2022-04-18 ENCOUNTER — APPOINTMENT (OUTPATIENT)
Dept: GENERAL RADIOLOGY | Age: 77
DRG: 605 | End: 2022-04-18
Payer: MEDICARE

## 2022-04-18 ENCOUNTER — HOSPITAL ENCOUNTER (INPATIENT)
Age: 77
LOS: 1 days | Discharge: HOME | DRG: 605 | End: 2022-04-18
Attending: EMERGENCY MEDICINE | Admitting: INTERNAL MEDICINE
Payer: MEDICARE

## 2022-04-18 VITALS
HEIGHT: 72 IN | TEMPERATURE: 97.5 F | SYSTOLIC BLOOD PRESSURE: 167 MMHG | WEIGHT: 240 LBS | RESPIRATION RATE: 16 BRPM | BODY MASS INDEX: 32.51 KG/M2 | OXYGEN SATURATION: 95 % | HEART RATE: 70 BPM | DIASTOLIC BLOOD PRESSURE: 74 MMHG

## 2022-04-18 DIAGNOSIS — S80.12XA LEG HEMATOMA, LEFT, INITIAL ENCOUNTER: Primary | ICD-10-CM

## 2022-04-18 LAB
ALBUMIN SERPL-MCNC: 4.4 G/DL (ref 3.5–5.2)
ALP BLD-CCNC: 62 U/L (ref 40–129)
ALT SERPL-CCNC: 28 U/L (ref 0–40)
ANION GAP SERPL CALCULATED.3IONS-SCNC: 12 MMOL/L (ref 7–16)
AST SERPL-CCNC: 27 U/L (ref 0–39)
BASOPHILS ABSOLUTE: 0.06 E9/L (ref 0–0.2)
BASOPHILS RELATIVE PERCENT: 1 % (ref 0–2)
BILIRUB SERPL-MCNC: 0.6 MG/DL (ref 0–1.2)
BUN BLDV-MCNC: 10 MG/DL (ref 6–23)
CALCIUM SERPL-MCNC: 9.2 MG/DL (ref 8.6–10.2)
CHLORIDE BLD-SCNC: 105 MMOL/L (ref 98–107)
CO2: 24 MMOL/L (ref 22–29)
CREAT SERPL-MCNC: 1.4 MG/DL (ref 0.7–1.2)
EOSINOPHILS ABSOLUTE: 0.35 E9/L (ref 0.05–0.5)
EOSINOPHILS RELATIVE PERCENT: 5.8 % (ref 0–6)
GFR AFRICAN AMERICAN: 59
GFR NON-AFRICAN AMERICAN: 49 ML/MIN/1.73
GLUCOSE BLD-MCNC: 108 MG/DL (ref 74–99)
HCT VFR BLD CALC: 36.7 % (ref 37–54)
HEMOGLOBIN: 12 G/DL (ref 12.5–16.5)
IMMATURE GRANULOCYTES #: 0.04 E9/L
IMMATURE GRANULOCYTES %: 0.7 % (ref 0–5)
LACTIC ACID: 1.2 MMOL/L (ref 0.5–2.2)
LYMPHOCYTES ABSOLUTE: 0.95 E9/L (ref 1.5–4)
LYMPHOCYTES RELATIVE PERCENT: 15.7 % (ref 20–42)
MCH RBC QN AUTO: 34.3 PG (ref 26–35)
MCHC RBC AUTO-ENTMCNC: 32.7 % (ref 32–34.5)
MCV RBC AUTO: 104.9 FL (ref 80–99.9)
METER GLUCOSE: 139 MG/DL (ref 74–99)
MONOCYTES ABSOLUTE: 0.66 E9/L (ref 0.1–0.95)
MONOCYTES RELATIVE PERCENT: 10.9 % (ref 2–12)
NEUTROPHILS ABSOLUTE: 3.99 E9/L (ref 1.8–7.3)
NEUTROPHILS RELATIVE PERCENT: 65.9 % (ref 43–80)
PDW BLD-RTO: 13.8 FL (ref 11.5–15)
PLATELET # BLD: 120 E9/L (ref 130–450)
PMV BLD AUTO: 11.8 FL (ref 7–12)
POTASSIUM REFLEX MAGNESIUM: 4 MMOL/L (ref 3.5–5)
RBC # BLD: 3.5 E12/L (ref 3.8–5.8)
SODIUM BLD-SCNC: 141 MMOL/L (ref 132–146)
TOTAL PROTEIN: 6.7 G/DL (ref 6.4–8.3)
WBC # BLD: 6.1 E9/L (ref 4.5–11.5)

## 2022-04-18 PROCEDURE — 2580000003 HC RX 258: Performed by: NURSE PRACTITIONER

## 2022-04-18 PROCEDURE — 6370000000 HC RX 637 (ALT 250 FOR IP): Performed by: NURSE PRACTITIONER

## 2022-04-18 PROCEDURE — 83605 ASSAY OF LACTIC ACID: CPT

## 2022-04-18 PROCEDURE — 82962 GLUCOSE BLOOD TEST: CPT

## 2022-04-18 PROCEDURE — 99283 EMERGENCY DEPT VISIT LOW MDM: CPT

## 2022-04-18 PROCEDURE — 2060000000 HC ICU INTERMEDIATE R&B

## 2022-04-18 PROCEDURE — 73590 X-RAY EXAM OF LOWER LEG: CPT

## 2022-04-18 PROCEDURE — 80053 COMPREHEN METABOLIC PANEL: CPT

## 2022-04-18 PROCEDURE — 85025 COMPLETE CBC W/AUTO DIFF WBC: CPT

## 2022-04-18 PROCEDURE — 97530 THERAPEUTIC ACTIVITIES: CPT

## 2022-04-18 PROCEDURE — 97165 OT EVAL LOW COMPLEX 30 MIN: CPT

## 2022-04-18 PROCEDURE — 6360000002 HC RX W HCPCS: Performed by: EMERGENCY MEDICINE

## 2022-04-18 PROCEDURE — 97535 SELF CARE MNGMENT TRAINING: CPT

## 2022-04-18 PROCEDURE — 97161 PT EVAL LOW COMPLEX 20 MIN: CPT

## 2022-04-18 RX ORDER — HYDROCODONE BITARTRATE AND ACETAMINOPHEN 5; 325 MG/1; MG/1
1 TABLET ORAL EVERY 6 HOURS PRN
Qty: 24 TABLET | Refills: 0 | Status: SHIPPED | OUTPATIENT
Start: 2022-04-18 | End: 2022-04-21

## 2022-04-18 RX ORDER — ONDANSETRON 4 MG/1
4 TABLET, ORALLY DISINTEGRATING ORAL EVERY 8 HOURS PRN
Status: DISCONTINUED | OUTPATIENT
Start: 2022-04-18 | End: 2022-04-18 | Stop reason: HOSPADM

## 2022-04-18 RX ORDER — LEVOTHYROXINE SODIUM 0.05 MG/1
50 TABLET ORAL DAILY
Status: DISCONTINUED | OUTPATIENT
Start: 2022-04-18 | End: 2022-04-18 | Stop reason: HOSPADM

## 2022-04-18 RX ORDER — FLUOXETINE 10 MG/1
10 TABLET, FILM COATED ORAL EVERY MORNING
Status: DISCONTINUED | OUTPATIENT
Start: 2022-04-18 | End: 2022-04-18 | Stop reason: HOSPADM

## 2022-04-18 RX ORDER — SODIUM CHLORIDE 0.9 % (FLUSH) 0.9 %
5-40 SYRINGE (ML) INJECTION PRN
Status: DISCONTINUED | OUTPATIENT
Start: 2022-04-18 | End: 2022-04-18 | Stop reason: HOSPADM

## 2022-04-18 RX ORDER — LEUCOVORIN CALCIUM 5 MG/1
10 TABLET ORAL WEEKLY
Status: DISCONTINUED | OUTPATIENT
Start: 2022-04-18 | End: 2022-04-18

## 2022-04-18 RX ORDER — SODIUM CHLORIDE 0.9 % (FLUSH) 0.9 %
5-40 SYRINGE (ML) INJECTION EVERY 12 HOURS SCHEDULED
Status: DISCONTINUED | OUTPATIENT
Start: 2022-04-18 | End: 2022-04-18 | Stop reason: HOSPADM

## 2022-04-18 RX ORDER — DIPHENHYDRAMINE HYDROCHLORIDE 50 MG/ML
25 INJECTION INTRAMUSCULAR; INTRAVENOUS ONCE
Status: COMPLETED | OUTPATIENT
Start: 2022-04-18 | End: 2022-04-18

## 2022-04-18 RX ORDER — ASPIRIN 81 MG/1
81 TABLET ORAL EVERY MORNING
Status: DISCONTINUED | OUTPATIENT
Start: 2022-04-18 | End: 2022-04-18 | Stop reason: HOSPADM

## 2022-04-18 RX ORDER — ROSUVASTATIN CALCIUM 10 MG/1
10 TABLET, COATED ORAL DAILY
Status: DISCONTINUED | OUTPATIENT
Start: 2022-04-18 | End: 2022-04-18 | Stop reason: HOSPADM

## 2022-04-18 RX ORDER — PANTOPRAZOLE SODIUM 40 MG/1
40 TABLET, DELAYED RELEASE ORAL
Status: DISCONTINUED | OUTPATIENT
Start: 2022-04-18 | End: 2022-04-18 | Stop reason: HOSPADM

## 2022-04-18 RX ORDER — CLOPIDOGREL BISULFATE 75 MG/1
75 TABLET ORAL DAILY
Status: DISCONTINUED | OUTPATIENT
Start: 2022-04-18 | End: 2022-04-18 | Stop reason: HOSPADM

## 2022-04-18 RX ORDER — GABAPENTIN 300 MG/1
300 CAPSULE ORAL 4 TIMES DAILY
Status: DISCONTINUED | OUTPATIENT
Start: 2022-04-18 | End: 2022-04-18 | Stop reason: HOSPADM

## 2022-04-18 RX ORDER — ONDANSETRON 2 MG/ML
4 INJECTION INTRAMUSCULAR; INTRAVENOUS EVERY 6 HOURS PRN
Status: DISCONTINUED | OUTPATIENT
Start: 2022-04-18 | End: 2022-04-18 | Stop reason: HOSPADM

## 2022-04-18 RX ORDER — METOPROLOL SUCCINATE 25 MG/1
25 TABLET, EXTENDED RELEASE ORAL EVERY MORNING
Status: DISCONTINUED | OUTPATIENT
Start: 2022-04-18 | End: 2022-04-18 | Stop reason: HOSPADM

## 2022-04-18 RX ORDER — ACETAMINOPHEN 650 MG/1
650 SUPPOSITORY RECTAL EVERY 6 HOURS PRN
Status: DISCONTINUED | OUTPATIENT
Start: 2022-04-18 | End: 2022-04-18 | Stop reason: HOSPADM

## 2022-04-18 RX ORDER — SENNA PLUS 8.6 MG/1
1 TABLET ORAL DAILY PRN
Status: DISCONTINUED | OUTPATIENT
Start: 2022-04-18 | End: 2022-04-18 | Stop reason: HOSPADM

## 2022-04-18 RX ORDER — HYDROCODONE BITARTRATE AND ACETAMINOPHEN 5; 325 MG/1; MG/1
1 TABLET ORAL EVERY 6 HOURS PRN
Status: DISCONTINUED | OUTPATIENT
Start: 2022-04-18 | End: 2022-04-18 | Stop reason: HOSPADM

## 2022-04-18 RX ORDER — ACETAMINOPHEN 325 MG/1
650 TABLET ORAL EVERY 6 HOURS PRN
Status: DISCONTINUED | OUTPATIENT
Start: 2022-04-18 | End: 2022-04-18 | Stop reason: HOSPADM

## 2022-04-18 RX ORDER — SODIUM CHLORIDE 9 MG/ML
INJECTION, SOLUTION INTRAVENOUS PRN
Status: DISCONTINUED | OUTPATIENT
Start: 2022-04-18 | End: 2022-04-18 | Stop reason: HOSPADM

## 2022-04-18 RX ORDER — SILDENAFIL 100 MG/1
100 TABLET, FILM COATED ORAL PRN
COMMUNITY
Start: 2022-03-18

## 2022-04-18 RX ADMIN — PANTOPRAZOLE SODIUM 40 MG: 40 TABLET, DELAYED RELEASE ORAL at 10:05

## 2022-04-18 RX ADMIN — ROSUVASTATIN 10 MG: 10 TABLET, FILM COATED ORAL at 11:30

## 2022-04-18 RX ADMIN — SODIUM CHLORIDE, PRESERVATIVE FREE 10 ML: 5 INJECTION INTRAVENOUS at 10:06

## 2022-04-18 RX ADMIN — GABAPENTIN 300 MG: 300 CAPSULE ORAL at 13:00

## 2022-04-18 RX ADMIN — METOPROLOL SUCCINATE 25 MG: 25 TABLET, EXTENDED RELEASE ORAL at 10:05

## 2022-04-18 RX ADMIN — HYDROCODONE BITARTRATE AND ACETAMINOPHEN 1 TABLET: 5; 325 TABLET ORAL at 06:10

## 2022-04-18 RX ADMIN — FLUOXETINE HYDROCHLORIDE 10 MG: 10 TABLET ORAL at 11:31

## 2022-04-18 RX ADMIN — ACETAMINOPHEN 650 MG: 325 TABLET ORAL at 14:29

## 2022-04-18 RX ADMIN — DIPHENHYDRAMINE HYDROCHLORIDE 25 MG: 50 INJECTION INTRAMUSCULAR; INTRAVENOUS at 02:35

## 2022-04-18 RX ADMIN — GABAPENTIN 300 MG: 300 CAPSULE ORAL at 10:06

## 2022-04-18 RX ADMIN — LEVOTHYROXINE SODIUM 50 MCG: 0.03 TABLET ORAL at 10:05

## 2022-04-18 ASSESSMENT — PAIN DESCRIPTION - PAIN TYPE: TYPE: ACUTE PAIN

## 2022-04-18 ASSESSMENT — PAIN DESCRIPTION - FREQUENCY: FREQUENCY: CONTINUOUS

## 2022-04-18 ASSESSMENT — PAIN DESCRIPTION - ORIENTATION: ORIENTATION: LEFT

## 2022-04-18 ASSESSMENT — PAIN SCALES - GENERAL
PAINLEVEL_OUTOF10: 8
PAINLEVEL_OUTOF10: 3
PAINLEVEL_OUTOF10: 4

## 2022-04-18 ASSESSMENT — PAIN DESCRIPTION - DESCRIPTORS: DESCRIPTORS: ACHING

## 2022-04-18 ASSESSMENT — PAIN DESCRIPTION - PROGRESSION: CLINICAL_PROGRESSION: GRADUALLY WORSENING

## 2022-04-18 ASSESSMENT — PAIN DESCRIPTION - LOCATION: LOCATION: LEG

## 2022-04-18 ASSESSMENT — PAIN DESCRIPTION - ONSET: ONSET: SUDDEN

## 2022-04-18 NOTE — ED PROVIDER NOTES
HPI:  4/18/22, Time: 12:34 AM MIROSLAVAT         Hitesh Villatoro Doctor is a 65 y.o. male presenting to the ED for left leg swelling, beginning since this afternoon ago. The complaint has been persistent, moderate in severity, and worsened by nothing. Patient reporting increase swelling and pain to his left lower extremity. He was seen at [facility].  Patient was sent here for further evaluation. Patient reporting about a week ago he pivoted somehow while walking and felt a pain in his left lower extremity. Patient reports this afternoon he started having swelling in the leg. Patient was seen at outlying facility had a CTA with runoff. There is some concern as to there are some arterial occlusion. Patient also had diminished pulses on the left lower extremity. Patient reporting no chest pain no difficulty breathing no abdominal pain he reports no fever chills. Patient does report prior history of Achilles repair on the left lower extremity. Patient is on Plavix. Vascular was contacted from [facility] facility and patient was sent here. ROS:   Pertinent positives and negatives are stated within HPI, all other systems reviewed and are negative.  --------------------------------------------- PAST HISTORY ---------------------------------------------  Past Medical History:  has a past medical history of Anxiety, Arthritis, ASHD (arteriosclerotic heart disease), Chronic anemia, Chronic kidney disease, stage III (moderate) ([address] Depression, GERD (gastroesophageal reflux disease), Hyperlipidemia, MI (myocardial infarction) ([address] Other ulcerative colitis, Psoriatic arthritis ([facility] and Sleep apnea. Past Surgical History:  has a past surgical history that includes Cardiac surgery (06/15/2001); other surgical history; Achilles tendon surgery (Left, 1998); Coronary angioplasty with stent (06/2016); Colonoscopy; Upper gastrointestinal endoscopy (2007); malignant skin lesion excision (Left, 02/28/2017);  Cardiac catheterization (01/04/2018); Total knee arthroplasty (Right, 12/2007); Total knee arthroplasty (Left, 07/2010); and lumbar fusion (02/2015). Social History:  reports that he quit smoking about 43 years ago. His smoking use included cigarettes. He started smoking about 59 years ago. He has a 15.00 pack-year smoking history. He has never used smokeless tobacco. He reports current alcohol use of about 5.0 standard drinks of alcohol per week. He reports that he does not use drugs. Family History: family history includes Diabetes in his father; Heart Disease in his father; Other in his mother. The patients home medications have been reviewed. Allergies: Amoxicillin, Flagyl [metronidazole], and Pcn [penicillins]    ---------------------------------------------------PHYSICAL EXAM--------------------------------------    Constitutional/General: Alert and oriented x3, well appearing, non toxic in NAD  Head: Normocephalic and atraumatic  Eyes: PERRL, EOMI  Mouth: Oropharynx clear, handling secretions, no trismus  Neck: Supple, full ROM, non tender to palpation in the midline, no stridor, no crepitus, no meningeal signs  Pulmonary: Lungs clear to auscultation bilaterally, no wheezes, rales, or rhonchi. Not in respiratory distress  Cardiovascular:  Regular rate. Regular rhythm. No murmurs, gallops, or rubs. 2+ distal pulses  Chest: no chest wall tenderness  Abdomen: Soft. Non tender. Non distended. +BS. No rebound, guarding, or rigidity. No pulsatile masses appreciated. Musculoskeletal: Moves all extremities x 4. Patient has noted swelling to left lower extremity mainly calf. Patient pulses obtained by Doppler. Somewhat faint in the dorsalis pedis compared to the right dorsalis pedis. Patient has sensation. Patient lower extremity on the left side is firm compared to right. Skin: warm and dry. No rashes.    Neurologic: GCS 15, CN 2-12 grossly intact, no focal deficits, symmetric strength 5/5 in the upper and lower extremities bilaterally  Psych: Normal Affect    -------------------------------------------------- RESULTS -------------------------------------------------  I have personally reviewed all laboratory and imaging results for this patient. Results are listed below. LABS:  No results found for this visit on 04/18/22. RADIOLOGY:  Interpreted by Radiologist.  XR TIBIA FIBULA LEFT (2 VIEWS)    (Results Pending)         EKG Interpretation        ------------------------- NURSING NOTES AND VITALS REVIEWED ---------------------------   The nursing notes within the ED encounter and vital signs as below have been reviewed by myself. /72   Pulse 74   Temp 97.4 °F (36.3 °C) (Oral)   Resp 16   Ht 6' (1.829 m)   Wt 240 lb (108.9 kg)   SpO2 96%   BMI 32.55 kg/m²   Oxygen Saturation Interpretation: Normal    The patients available past medical records and past encounters were reviewed. ------------------------------ ED COURSE/MEDICAL DECISION MAKING----------------------  Medications - No data to display          Medical Decision Making:   Presenting here because of left leg swelling that started acutely this afternoon. Patient reported injury about a week ago. Patient reporting no chest pain no difficulty breathing. Patient had work-up done at outlying facility. Patient was sent here for vascular surgery evaluation. Orthopedics was also consulted for concern for possible compartment syndrome. Re-Evaluations:             Reeval patient made aware of findings he was eval by orthopedics and vascular. .  Patient will be admitted for further evaluation and treatment. Consultations:               Ortho and vascular  Critical Care: This patient's ED course included: a personal history and physicial eaxmination    This patient has been closely monitored during their ED course. Counseling:    The emergency provider has spoken with the patient and discussed todays results, in addition to providing specific details for the plan of care and counseling regarding the diagnosis and prognosis. Questions are answered at this time and they are agreeable with the plan.       --------------------------------- IMPRESSION AND DISPOSITION ---------------------------------    IMPRESSION  1. Leg hematoma, left, initial encounter        DISPOSITION  Disposition: Admit to telemetry  Patient condition is stable        NOTE: This report was transcribed using voice recognition software.  Every effort was made to ensure accuracy; however, inadvertent computerized transcription errors may be present          Ad Bonds MD  04/18/22 0246       Ad Bonds MD  04/18/22 5920

## 2022-04-18 NOTE — PROGRESS NOTES
2014 Called access center for patient transfer to [facility] for Left calf Hematoma  2021 Dr. Kathia Cardenas.  Russell Perez is the accepting physician

## 2022-04-18 NOTE — H&P
[facility]  History and Physical      CHIEF COMPLAINT:    Chief Complaint   Patient presents with    Leg Pain     Transfer from [facility] Pain and swelling to left lower leg        Patient of Rsoalva Valencia MD presents with:  Hematoma of left lower leg    History of Present Illness:   Patient is a 76-year-old male with past medical history of anxiety, arthritis, chronic anemia, CKD, GERD, hyperlipidemia, MI, psoriatic arthritis, and sleep apnea. Patient presents to the ED with complaints of left leg swelling. Patient initially went to [facility] and was transferred to [facility]. Patient states yesterday afternoon approximately 130 he began having leg pain with increased swelling patient states he became nervous and he went to the ED. Patient states a couple weeks ago he was attempting to pet vet and he heard a loud pop. He does report a history of Achilles tendon repair. Patient is currently on Plavix as he has cardiac stents. Patient is alert and oriented on examination. Patient denies any chest pain, shortness of breath, fever, chills, headache, dizziness, blurred vision, and abdominal pain. ER work-up revealed possible occlusion on CTA. Patient was admitted to telemetry unit for further testing and treatment. REVIEW OF SYSTEMS:  Pertinent negatives are above in HPI. 10 point ROS otherwise negative. Past Medical History:   Diagnosis Date    Anxiety     Arthritis     rheumatoid arthritis    ASHD (arteriosclerotic heart disease)     Chronic anemia     Chronic kidney disease, stage III (moderate) (HCC)     Depression     GERD (gastroesophageal reflux disease)     Hyperlipidemia     MI (my[address] infarction) ([facility]     NSTEMI 7/2000    Other ulcerative colitis     BY BIOPSY    Psoriatic arthritis ([facility]     Sleep apnea          Past Surgical History:   Procedure Laterality Date   Cyndee Aragon.   [facility]  CARDIAC CATHETERIZATION  01/04/2018    Dr. Kendra Carmen  06/15/2001    cabg x 2, DR Vivas Judith, SVG-RCA    COLONOSCOPY      CORONARY ANGIOPLASTY WITH STENT PLACEMENT  06/2016    LUMBAR FUSION  02/2015    L4-S1   DR. ALVARO ACUNA [facility]    MALIGNANT SKIN LESION EXCISION Left 02/28/2017    VORTEX SCALP    OTHER SURGICAL HISTORY      lumbar puncture    TOTAL KNEE ARTHROPLASTY Right 12/2007    Right TKA. Dr. Ernesto Hood Left 07/2010    Left TKA.     UPPER GASTROINTESTINAL ENDOSCOPY  2007       Medications Prior to Admission:    Medications Prior to Admission: sildenafil (VIAGRA) 100 MG tablet, Take 100 mg by mouth as needed  zinc 50 MG TABS tablet, Take 50 mg by mouth daily  diphenhydrAMINE-APAP, sleep, (TYLENOL PM EXTRA STRENGTH)  MG tablet, Take 2 tablets by mouth nightly as needed for Sleep  Cholecalciferol (VITAMIN D3 PO), Take 100 mcg by mouth daily  levothyroxine (SYNTHROID) 50 MCG tablet, Take 50 mcg by mouth Daily  rosuvastatin (CRESTOR) 10 MG tablet, Take 10 mg by mouth daily   clopidogrel (PLAVIX) 75 MG tablet, Take 1 tablet by mouth daily  gabapentin (NEURONTIN) 300 MG capsule, Take 300 mg by mouth 4 times daily. metoprolol succinate (TOPROL XL) 25 MG extended release tablet, Take 25 mg by mouth every morning  FLUoxetine (PROZAC) 10 MG tablet, Take 10 mg by mouth every morning   LORazepam (ATIVAN) 0.5 MG tablet, Take 0.25-0.5 mg by mouth 3 times daily as needed for Anxiety . methotrexate 2.5 MG tablet, Take 15 mg by mouth once a week Monday  leucovorin (WELLCOVORIN) 5 MG tablet, Take 10 mg by mouth once a week.  tuesday   omeprazole (PRILOSEC) 20 MG capsule, Take 20 mg by mouth every morning   aspirin 81 MG EC tablet, Take 81 mg by mouth every morning     Note that the patient's home medications were reviewed and the above list is accurate to the best of my knowledge at the time of the exam.    Allergies:    Amoxicillin, Flagyl [metronidazole], and Pcn [penicillins]    Social History:    reports that he quit smoking about 43 years ago. His smoking use included cigarettes. He started smoking about 59 years ago. He has a 15.00 pack-year smoking history. He has never used smokeless tobacco. He reports current alcohol use of about 5.0 standard drinks of alcohol per week. He reports that he does not use drugs. Family History:   family history includes Diabetes in his father; Heart Disease in his father; Other in his mother. PHYSICAL EXAM:    Vitals:  /73   Pulse 60   Temp 97.4 °F (36.3 °C) (Oral)   Resp 16   Ht 6' (1.829 m)   Wt 240 lb (108.9 kg)   SpO2 96%   BMI 32.55 kg/m²       General appearance: NAD, conversant, pleasant  Eyes: Sclerae anicteric, PERRLA  HEENT: AT/NC, MMM  Neck: FROM, supple, no thyromegaly  Lymph: No cervical / supraclavicular lymphadenopathy  Lungs: Clear to auscultation, WOB normal  CV: RRR, murmur, left  lower extremity edema,   Abdomen: Soft, non-tender; no masses or HSM, +BS  Extremities: FROM without synovitis. No clubbing or cyanosis of the hands. Left lower extremity edema  Skin: no rash, induration, lesions, or ulcers  Psych: Calm and cooperative. Normal judgement and insight. Normal mood and affect. Neuro: Alert and interactive, face symmetric, speech fluent. LABS:  All labs reviewed.   Of note:  CBC with Differential:    Lab Results   Component Value Date    WBC 6.1 04/18/2022    RBC 3.50 04/18/2022    HGB 12.0 04/18/2022    HCT 36.7 04/18/2022     04/18/2022    .9 04/18/2022    MCH 34.3 04/18/2022    MCHC 32.7 04/18/2022    RDW 13.8 04/18/2022    NRBC 0.0 04/17/2022    BANDSPCT 1 11/06/2016    METASPCT 1.8 01/05/2018    LYMPHOPCT 15.7 04/18/2022    MONOPCT 10.9 04/18/2022    MYELOPCT 0.9 01/05/2018    BASOPCT 1.0 04/18/2022    MONOSABS 0.66 04/18/2022    LYMPHSABS 0.95 04/18/2022    EOSABS 0.35 04/18/2022    BASOSABS 0.06 04/18/2022     CMP:    Lab Results   Component Value Date     04/18/2022    K 4.0 04/18/2022     04/18/2022    CO2 24 04/18/2022    BUN 10 04/18/2022    CREATININE 1.4 04/18/2022    GFRAA 59 04/18/2022    LABGLOM 49 04/18/2022    GLUCOSE 108 04/18/2022    GLUCOSE 102 03/10/2011    PROT 6.7 04/18/2022    LABALBU 4.4 04/18/2022    LABALBU 4.7 03/10/2011    CALCIUM 9.2 04/18/2022    BILITOT 0.6 04/18/2022    ALKPHOS 62 04/18/2022    AST 27 04/18/2022    ALT 28 04/18/2022       Imaging:  US LLE: No evidence of DVT. Large posterior calf subcutaneous heterogenous fluid collection measuring 17.1 x 3.3 x 5.7 cm suggestive of a hematoma. CTA abdominal aorta: Findings consistent with left calf hematoma and subcutaneous edema. Two-vessel arterial runoff into the foot appears to be present with anterior tibial artery occluded. On the right the calf arteries are not well assessed probably at least the anterior tibial artery is occluded otherwise indeterminate as described above. No acute arterial abnormality extending from abdominal aorta through iliac and femoral-popliteal arteries as able to be visualized with atherosclerotic disease noted. X-ray left tibia: No acute osseous abnormality. Telemetry:  NSR    ASSESSMENT/PLAN:  Principal Problem:    Hematoma of left lower leg  Active Problems:    Mitral valve regurgitation    Aortic valve regurgitation    Chronic anemia    Hyperlipidemia LDL goal <100    Essential hypertension    CKD (chronic kidney disease) stage 3, GFR 30-59 ml/min ([facility])    Obesity (BMI 30.0-34. 9)  Resolved Problems:    * No resolved hospital problems. *    68-year-old male is admitted to telemetry unit with    Hematoma left lower leg  Vascular surgery following  Neuro checks q 2 hours  Orthopedic surgery following - appreciate input - signed off  Apply ace wrap    Medication for other comorbidities continue as appropriate dose adjustment as necessary.     DVT prophylaxis  PT OT  Discharge planning  Case discussed with attending and agreed upon plan of care.     Code status: Full  Requires inpatient level of care  CALLIE Meraz CNP    8:12 AM  4/18/2022

## 2022-04-18 NOTE — CARE COORDINATION
Met with pt to discuss discharge planning/transition of care. Pt lives with spouse and daughter during school breaks. They reside in a 2 story home, pt stays on first floor with bedroom and bathroom, 2 steps to enter house. Pt is an active , has no DME. Order for walker, pt choices [facility] DME, referral to [facility] DME. Dr. Lexi Watters is PCP and [facility] in [facility] is pharmacy. Plan is home at discharge, dtr to transport. Pt is discharging today. Sirena Hernández, MSW, LSW. The Plan for Transition of Care is related to the following treatment goals: The Patient and/or patient representative Aston Doctor was provided with a choice of provider and agrees   with the discharge plan. [x] Yes [] No    Freedom of choice list was provided with basic dialogue that supports the patient's individualized plan of care/goals, treatment preferences and shares the quality data associated with the providers.  [x] Yes [] No

## 2022-04-18 NOTE — ED NOTES
Patient reports no increase in pain in leg. Patient reports need to urinate, this RN helped patient stand beside bedside to use urinal, the patient requested that he be left alone to stand and urinate, this RN informed patient for his safety that I was not able to leave patient alone to stand by himself. Patient got frustrated and sat back in bed. This RN offered different solution to using the urinal, patient stated he could not use urinal sitting up in bed and will try later.       Katherine Lorenzo RN  04/18/22 1926

## 2022-04-18 NOTE — PROGRESS NOTES
[address] [place] [address]        Date:2022                                                  Patient Name: Shiva Boss Doctor    MRN: [id number]    : [id number]    Room: [address] [place] [address]          Evaluating OT: Sujatha Gregorio OTR/L; [id number]       Referring Provider: CALLIE Anderson CNP    Specific Provider Orders/Date: OT Eval and Treat 22      Diagnosis: Hematoma of left lower leg    Surgery: None this admission     Pertinent Medical History:  has a past medical history of Anxiety, Arthritis, ASHD (arteriosclerotic heart disease), Chronic anemia, Chronic kidney disease, stage III (moderate) ([facility] [address] Depression, GERD (gastroesophageal reflux disease), Hyperlipidemia, MI (myocardial infarction) ([facility] [address] Other ulcerative colitis, Psoriatic arthritis ([facility] [address] and Sleep apnea.     Recommended Adaptive Equipment: TBD     Precautions:  Fall Risk, WBAT LLE, elevate & compression/ACE wrap LLE, NPO, +bed alarm     Assessment of current deficits    [x] Functional mobility  [x]ADLs  [x] Strength               []Cognition    [x] Functional transfers   [x] IADLs         [x] Safety Awareness   [x]Endurance    [] Fine Coordination              [x] Balance      [] Vision/perception   []Sensation     []Gross Motor Coordination  [] ROM  [] Delirium                   [] Motor Control     OT PLAN OF CARE   OT POC based on physician orders, patient diagnosis and results of clinical assessment    Frequency/Duration 1-3 days/wk for 2 weeks PRN   Specific OT Treatment Interventions to include:   * Instruction/training on adapted ADL techniques and AE recommendations to increase functional independence within precautions       * Training on energy conservation strategies, correct breathing pattern and techniques to improve independence/tolerance for self-care routine  * Functional transfer/mobility training/DME recommendations for increased independence, safety, and fall prevention  * Patient/Family education to increase follow through with safety techniques and functional independence  * Recommendation of environmental modifications for increased safety with functional transfers/mobility and ADLs  * Therapeutic exercise to improve motor endurance, ROM, and functional strength for ADLs/functional transfers  * Therapeutic activities to facilitate/challenge dynamic balance, stand tolerance for increased safety and independence with ADLs  * Positioning to improve skin integrity, interaction with environment and functional independence    Home Living: Pt lives with wife in 2 story home with 2 steps to enter & 1 handrail. Bed & bath on 2nd floor - full flight of stairs & 1 handrail. Bathroom setup: Walk-in shower   Equipment owned: None    Prior Level of Function: IND with ADLs , IND with IADLs; ambulated without AD  Driving: Yes    Pain Level: Pt c/o pain LLE however did not rate on scale; therapist provided repositioning techniques  Cognition: A&O: 4/4; Follows multi step directions   Memory:  Good   Sequencing:  Fair+   Problem solving:  Fair+   Judgement/safety:  Fair     Functional Assessment:  AM-PAC Daily Activity Raw Score: 16/24   Initial Eval Status  Date: 4/18/22 Treatment Status  Date: STGs = LTGs  Time frame: 10-14 days   Feeding Independent      Grooming Minimal Assist   Modified [place]    UB Dressing Minimal Assist   Modified [place]    LB Dressing Maximal Assist   SBA   Bathing Moderate Assist  Stand by Assist    Toileting Moderate Assist   Stand by Assist    Bed Mobility  Supine to sit: SBA  Sit to supine: NT  Supine to sit:  Independent   Sit to supine: Independent    Functional Transfers Sit to stand:Minimal Assist   Stand to sit:Minimal Assist  Stand pivot: Minimal Assist  Commode: Minimal Assist  Sit to stand:Modified [place]    Stand to sit:Modified [place]   Stand pivot: Modified [place]   Commode: Modified [place]     Functional Mobility Minimal Assist  Use of ww within household distance  Modified [place] with appropriate AD   Balance Sitting:     Static - SBA     Dynamic - Minimal Assist  Standing: Minimal Assist  Sitting:     Static: Independent     Dynamic: Independent  Standing: Modified [place]    Activity Tolerance Fair  Good   Visual/  Perceptual Glasses: Yes  Appears WFL        Safety Fair  Good  during ADL completion following WBAT LLE throughout ADLs     Hand Dominance Right   AROM (PROM) Strength Additional Info:  Goal:   RUE  WFL 4/5 grossly tested good  and wfl FMC/dexterity noted during ADL tasks   Improve overall RUE strength WFL for participation in functional tasks       LUE WFL 4-/5 grossly tested Good  and wfl FMC/dexterity noted during ADL tasks   Improve overall LUE strength WFL for participation in functional tasks       Hearing: [employer]   Sensation:  No c/o numbness or tingling BUE  Tone: WFL BUE  Edema: Unremarkable    Comment: Cleared by RN to see pt. Upon arrival patient supine in bed and agreeable to OT session with daughter in room. At end of session, patient seated in bedside chair with BLEs elevated, call light and phone within reach, all lines and tubes intact. Overall patient demonstrated decreased independence and safety during completion of ADL/functional transfer/mobility tasks. Therapist facilitated ADL tasks, functional transfers, functional mobility, bed mobility to promote safety awareness, fall prevention strategies, & independence throughout daily activities. Pt would benefit from continued skilled OT to increase safety and independence with completion of ADL/IADL tasks for functional independence and quality of life.     Treatment: OT treatment provided this date includes:    ADL-  Instruction/training on safety and adapted techniques for completion of ADLs: Pt able to void self while seated on commode with cuing for proper body mechanics to maintain safety awareness. Pt stood at sink ~5 minutes to wash hands & brush teeth. Pt educated on fall prevention strategies & activity modifications/adapations to promote safety & independence throughout ADLs.   Mobility-  Instruction/training on safety and improved independence with bed mobility/functional transfers and functional mobility. Pt utilized ww to maintain dynamic standing balance & WBAT LLE. Pt completed functional mobility to<>from bathroom with cuing for proper body mechanics & implementation of fall prevention strategies.   Sitting EOB ~5 minutes to improve dynamic sitting balance and activity tolerance during ADLs.   Skilled positioning/alignment-  Proper Positioning/Alignment. Pt required cuing, education, & assist to maintain WBAT LLE throughout duration of session. Pt educated on fall prevention strategies to promote safety awareness. Rehab Potential: Good for established goals     LTG: maximize independence with ADLs to return to PLOF    Patient and/or family were instructed on functional diagnosis, prognosis/goals and OT plan of care. Demonstrated fair understanding. Eval Complexity: Low  · History: Expanded chart review of medical records and additional review of physical, cognitive, or psychosocial history related to current functional performance  · Exam: 3+ performance deficits  · Assistance/Modification: Min/mod assistance or modifications required to perform tasks. May have comorbidities that affect occupational performance. Evaluation time includes thorough review of current medical information, gathering information on past medical & social history & PLOF, completion of standardized testing, informal observation of tasks, consultation with other medical professions/disciplines, assessment of data & development of POC/goals.      Time In: 10:35a  Time Out: 11:02a  Total Treatment Time: 12 minutes    Min Units   OT Eval Low [id number]  x     OT Eval Medium [address]      OT Re-Eval [id number]       Therapeutic Ex [id number]       Therapeutic Activities [id number]       ADL/Self Care 70110  12 1    Orthotic Management 37026       Manual [id number]     Neuro Re-Ed [id number]       Non-Billable Time          Evaluation Time additionally includes thorough review of current medical information, gathering information on past medical history/social history and prior level of function, interpretation of standardized testing/informal observation of tasks, assessment of data and development of plan of care and goals.             Ani Matthew OTR/L; [id number]

## 2022-04-18 NOTE — PROGRESS NOTES
Patient seen and examined at bedside. He was sleeping upon my arrival to the room. He has noted some improvement in his pain since the elevation and compression of his left lower extremity. He has not had any additional pain medications since last exam.  He denies changes in sensation. Physical exam demonstrates continued swelling but compressibility to the compartments of the left lower leg. There is some improvement from previous exam.  Patient continues to tolerate passive dorsiflexion with minimal discomfort. He is able to actively plantarflex and dorsiflex the ankle. Sensation is present in the tibial, superficial peroneal, deep peroneal, sural, and saphenous nerve distributions and equal to contralateral side. Posterior tibial pulse is palpable and comparable to contralateral side. Patient's examination is reassuring as his symptoms are slowly improving and he has not required any recent pain medications. We will continue elevation and gentle compression of the extremity. Recommend PO pain medications as needed given reduction in his pain levels. Recommend additional trial of ambulation with assistive devices this morning. No changes in management at this time. No anticipated orthopedic surgical intervention, however, he will remain NPO as we continue to evaluate.

## 2022-04-18 NOTE — ED NOTES
Report called to [facility]. Pt ready for transport, will continue to monitor.       Alondra Charles RN  04/17/22 2127

## 2022-04-18 NOTE — ED NOTES
Patient reports that his leg is feeling \"alittle better\". Sensations equal on both sides.      Nataliya Hong RN  04/18/22 2160

## 2022-04-18 NOTE — CONSULTS
Vascular Surgery Consultation Note    Reason for Consult:  LLE hematoma, possible occlusion of L AT    HPI:    This is a 65 y.o. male who is admitted to the hospital for treatment of LLE pain and swelling. Approximately one week ago, the patient pivoted while walking at his home and felt a popping sensation posterior to his left knee. He has had posterior calf pain since this time. This afternoon, around 1:30pm, he began to notice swelling and increased tenderness to his LLE. He originally presented to the [facility] ED and was transferred to [address] [place] [address] for vascular and orthopedic surgery evaluation. Prior to transfer, CTA abdomen pelvis with runoff and LLE ultrasound were performed. 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 Ultrasound showed a fluid collection in the posteromedial aspect of the left leg, measuring 17 x 5.5 x 3.5 cm. The patient is a former smoker. He has had prior CABG. He has had a prior L achilles tendon repair in [facility] [employer] done ~20 years ago following trauma. He has had no other vascular interventions. He takes Plavix, but no other blood thinning medications. He takes methotrexate for psoriatic arthritis. He is typically able to ambulate up and down steps and around the block without chest pain. He has a former 15-year smoking history, but has not smoked in several years.     ROS: Negative if blank [], Positive if [x]  General Vascular   [] Fevers [] Claudication (Blocks)   [] Chills [x] Rest Pain   [] Weight Loss [] Tissue Loss   [] Chest Pain [] Clotting Disorder   [] SOB at rest [x] Leg Swelling   [] SOB with exertion [] DVT/PE      [] Nausea    [] Vomiting [] Stroke/TIA   [] Abdominal Pain [] Focal weakness   [] Melena [] Slurred Speech   [] Hematochezia [] Vision Changes   [] Hematuria    [] Dysuria [] Hx of Central Catheters - no   [] Wears Glasses/Contacts [] Dialysis - never   [] Blindness       [] Difficulty swallowing        Past Medical History:   Diagnosis Date    Anxiety     Arthritis     rheumatoid arthritis    ASHD (arteriosclerotic heart disease)     Chronic anemia     Chronic kidney disease, stage III (moderate) (HCC)     Depression     GERD (gastroesophageal reflux disease)     Hyperlipidemia     MI (myocardial infarction) ([facility] [address]     NSTEMI 7/2000    Other ulcerative colitis     BY BIOPSY    Psoriatic arthritis ([facility] [address]     Sleep apnea         Past Surgical History:   Procedure Laterality Date   Nubia Morrow. [facility]    CARDIAC CATHETERIZATION  01/04/2018    Dr. Maritza Randolph  06/15/2001    cabg x 2, DR Chloe Brunner, SVG-RCA    COLONOSCOPY      CORONARY ANGIOPLASTY WITH STENT PLACEMENT  06/2016    LUMBAR FUSION  02/2015    L4-S1   DR. ALVARO ACUNA [facility]    MALIGNANT SKIN LESION EXCISION Left 02/28/2017    [facility] SCALP    OTHER SURGICAL HISTORY      lumbar puncture    TOTAL KNEE ARTHROPLASTY Right 12/2007    Right TKA. Dr. Isaías Chaudhari Left 07/2010    Left TKA.     UPPER GASTROINTESTINAL ENDOSCOPY  2007       Current Medications:      Prior to Admission medications    Medication Sig Start Date End Date Taking?  Authorizing Provider   zinc 50 MG TABS tablet Take 50 mg by mouth daily    Historical Provider, MD   diphenhydrAMINE-APAP, sleep, (TYLENOL PM EXTRA STRENGTH)  MG tablet Take 2 tablets by mouth nightly as needed for Sleep    Historical Provider, MD   Cholecalciferol (VITAMIN D3 PO) Take 100 mcg by mouth daily    Historical Provider, MD   fluorouracil (EFUDEX) 5 % cream APPLY TO AFFECTED AREA TWICE A DAY X2WEEKS 11/22/19   Historical Provider, MD   levothyroxine (SYNTHROID) 50 MCG tablet Take 50 mcg by mouth Daily    Historical Provider, MD   rosuvastatin (CRESTOR) 10 MG tablet Take 10 mg by mouth daily  5/1/18   Historical Provider, MD   clopidogrel (PLAVIX) 75 MG tablet Take 1 tablet by mouth daily 1/6/18   Page Bolton Ligia May MD   gabapentin (NEURONTIN) 300 MG capsule Take 300 mg by mouth 4 times daily. Historical Provider, MD   metoprolol succinate (TOPROL XL) 25 MG extended release tablet Take 25 mg by mouth every morning    Historical Provider, MD   FLUoxetine (PROZAC) 10 MG tablet Take 10 mg by mouth every morning     Historical Provider, MD   LORazepam (ATIVAN) 0.5 MG tablet Take 0.25-0.5 mg by mouth 3 times daily as needed for Anxiety . Historical Provider, MD   methotrexate 2.5 MG tablet Take 15 mg by mouth once a week Monday    Historical Provider, MD   leucovorin (WELLCOVORIN) 5 MG tablet Take 10 mg by mouth once a week. tuesday     Historical Provider, MD   omeprazole (PRILOSEC) 20 MG capsule Take 20 mg by mouth every morning     Historical Provider, MD   aspirin 81 MG EC tablet Take 81 mg by mouth every morning     Historical Provider, MD              Allergies:  Amoxicillin, Flagyl [metronidazole], and Pcn [penicillins]    Social History     Socioeconomic History    Marital status:      Spouse name: Not on file    Number of children: Not on file    Years of education: Not on file    Highest education level: Not on file   Occupational History    Occupation: retired- manager     Comment: [employer]   Tobacco Use    Smoking status: Former Smoker     Packs/day: 1.00     Years: 15.00     Pack years: 15.00     Types: Cigarettes     Start date:      Quit date: 1978     Years since quittin.5    Smokeless tobacco: Never Used   Vaping Use    Vaping Use: Not on file   Substance and Sexual Activity    Alcohol use:  Yes     Alcohol/week: 5.0 standard drinks     Types: 5 Shots of liquor per week     Comment: 1-2 beers a day    Drug use: No    Sexual activity: Not Currently   Other Topics Concern    Not on file   Social History Narrative    Not on file     Social Determinants of Health     Financial Resource Strain:     Difficulty of Paying Living Expenses: Not on file   Food Insecurity:     Worried About Running Out of Food in the Last Year: Not on file    Chuy of Food in the Last Year: Not on file   Transportation Needs:     Lack of Transportation (Medical): Not on file    Lack of Transportation (Non-Medical):  Not on file   Physical Activity:     Days of Exercise per Week: Not on file    Minutes of Exercise per Session: Not on file   Stress:     Feeling of Stress : Not on file   Social Connections:     Frequency of Communication with Friends and Family: Not on file    Frequency of Social Gatherings with Friends and Family: Not on file    Attends Bahai Services: Not on file    Active Member of [address] [place] [address] [employer] or Organizations: Not on file    Attends Club or Organization Meetings: Not on file    Marital Status: Not on file   Intimate Partner Violence:     Fear of Current or Ex-Partner: Not on file    Emotionally Abused: Not on file    Physically Abused: Not on file    Sexually Abused: Not on file   Housing Stability:     Unable to Pay for Housing in the Last Year: Not on file    Number of [place] in the Last Year: Not on file    Unstable Housing in the Last Year: Not on file        Family History   Problem Relation Age of Onset    Other Mother         sepsis    Diabetes Father     Heart Disease Father           PHYSICAL EXAM:    /72   Pulse 74   Temp 97.4 °F (36.3 °C) (Oral)   Resp 16   Ht 6' (1.829 m)   Wt 240 lb (108.9 kg)   SpO2 96%   BMI 32.55 kg/m²   CONSTITUTIONAL:  awake, alert, cooperative, no apparent distress, and appears stated age  NEURO:  Normal  EYES:  lids and lashes normal, sclera clear and conjunctiva normal  ENT:  normocepalic, without obvious abnormality, external ears without lesions  NECK:  supple, symmetrical, trachea midline  LUNGS:  no increased work of breathing  CARDIOVASCULAR:  regular rate and rhythm  ABDOMEN:  soft, non-distended, non-tender, Aorta is not palpable  SKIN:  no bruising or bleeding    EXTREMITIES:    R trauma.   On Plavix    - admit for observation  - sensation and pulse checks q2hrs  - elevate LLE  - gentle compression with ACE wrap applied to LLE    Discussed with Dr. Pranav Dean DO  4/18/22  1:21 AM EDT

## 2022-04-18 NOTE — PROGRESS NOTES
Physical Therapy  Physical Therapy Initial Assessment     Name: Baciilo Pérez Doctor  : 1945  MRN: [id number]      Date of Service: 2022    Evaluating PT:  Zcak Altamirano PT, DPT [id number]    Room #:  0463/9187-H  Diagnosis:  Leg hematoma, left, initial encounter [S80.12XA]  Hematoma of left lower leg [S80.12XA]  PMHx/PSHx:  MI, HLD, anxiety, psoriatic arthritis, chronic anemia, CKD, ulcerative colitis, depression, GERD, ASHD, CABG, coronary angioplasty with stent , lumbar fusion  Procedure/Surgery:  None  Precautions:  Falls, WBAT LLE  Equipment Needs:  FWW  Equipment Owned: None    SUBJECTIVE:    Pt lives with wife (and daughter some of the time) in a 2 story home + basement with 2 stair(s) to enter and 1 rail(s). Bed is on 2nd floor and bath is on 2nd floor. There is a full flight of stairs with 1 rail to 2nd floor; full flight of stairs with 1 rail to basement. Pt ambulated with no AD PTA. OBJECTIVE:   Initial Evaluation  Date: 2022 Treatment Short Term/ Long Term   Goals   AM-PAC 6 Clicks 41/29     Was pt agreeable to Eval/treatment? Yes     Does pt have pain? Mild LLE     Bed Mobility  Rolling: NT  Supine to sit: SBA  Sit to supine: NT  Scooting: NT  Rolling: Independent  Supine to sit:  Independent  Sit to supine: Independent  Scooting: Independent   Transfers Sit to stand: Low  Stand to sit: Low  Stand pivot: NT  Sit to stand: Pilar  Stand to sit: Pilar  Stand pivot: Pilar with AAD   Ambulation    10', 10' with [employer] Low  150 feet with AAD Pilar   Stair negotiation: ascended and descended  NT  12 step(s) with 1 rail(s) Pilar   ROM BUE:  See OT note  BLE:  WFL     Strength BUE:  See OT note  BLE:  Grossly 5/5     Balance Sitting EOB:  SBA  Dynamic Standing:  Low with [employer]  Sitting EOB:  Independent  Dynamic Standing:  Pilar with AAD     Pt is A & O x 4  Sensation:  Pt denies numbness and tingling to extremities  Edema:  Unremarkable    Vitals:   HR 70, SpO2 94% at rest.    Patient education  Pt educated on role of PT, weight bearing status, proper positioning/sequencing during functional mobility, safety during functional mobility, use of call light for assistance. Patient response to education:   Pt verbalized understanding Pt demonstrated skill Pt requires further education in this area   Yes Yes Yes     ASSESSMENT:    Conditions Requiring Skilled Therapeutic Intervention:    [x]Decreased strength     []Decreased ROM  [x]Decreased functional mobility  [x]Decreased balance   [x]Decreased endurance   [x]Decreased posture  []Decreased sensation  []Decreased coordination   []Decreased vision  [x]Decreased safety awareness   [x]Increased pain       Comments:  Session cleared by nursing. Patient in semi-Almeida's position upon arrival; agreeable to PT evaluation with OT collaboration. Educated on weight bearing status; verbalized understanding. Required increased time to perform bed mobility. Reported 3/10 dizziness upon sitting EOB that resolved. Educated on proper positioning and sequencing regarding step to gait pattern prior to mobilization. Required increased time, verbal cues related to positioning/sequencing to ensure safety during functional transfers. Ambulated to/from bathroom, where care was transferred to OT. Ambulated with step to pattern, decreased speed, mild unsteadiness. Patient left sitting in bedside chair with BLE elevated, call light in reach. Instructed not to get up on own and to use call light for assistance; verbalized understanding. Patient would benefit from continued skilled PT services to address functional deficits.     Treatment:  Patient practiced and was instructed in the following treatment:     Bed mobility - verbal cues to facilitate proper positioning; physical assistance provided as needed during activity   Static sitting - performed to promote upright tolerance, postural awareness, and balance maintenance   Functional transfers - verbal cues to facilitate proper positioning and sequencing, particularly related to hand/foot placement; physical assistance provided during activity   Ambulation - education and verbal cues to facilitate proper positioning and sequencing; physical assistance provided during activity    Pt's/ family goals   1. Return home    Prognosis is good for reaching above PT goals. Patient and or family understand(s) diagnosis, prognosis, and plan of care. Yes    PHYSICAL THERAPY PLAN OF CARE:    PT POC is established based on physician order and patient diagnosis     Referring provider/PT Order:    04/18/22 0315  PT evaluation and treat  Start:  04/18/22 0315,   End:  04/18/22 0315,   ONE TIME,   Standing Count:  1 Occurrences,   R         Supriya Stark, APRN - CNP       Diagnosis:  Leg hematoma, left, initial encounter [S80.12XA]  Hematoma of left lower leg [S80.12XA]  Specific instructions for next treatment:  Continue to facilitate safe performance of functional mobility; progress as appropriate. Current Treatment Recommendations:     [x] Strengthening to improve independence with functional mobility   [] ROM to improve independence with functional mobility   [x] Balance Training to improve static/dynamic balance and to reduce fall risk  [x] Endurance Training to improve activity tolerance during functional mobility   [x] Transfer Training to improve safety and independence with all functional transfers   [x] Gait Training to improve gait mechanics, endurance and assess need for appropriate assistive device  [x] Stair Training in preparation for safe discharge home and/or into the community   [x] Positioning to prevent skin breakdown and contractures  [x] Safety and Education Training   [x] Patient/Caregiver Education   [] HEP  [] Other     PT long term treatment goals are located in above grid    Frequency of treatments: 2-5x/week x 1-2 weeks.     Time in  1030  Time out  1105    Total Treatment Time  25 minutes     Evaluation Time includes thorough review of current medical information, gathering information on past medical history/social history and prior level of function, completion of standardized testing/informal observation of tasks, assessment of data and education on plan of care and goals.     CPT codes:  [x] Low Complexity PT evaluation 03459  [] Moderate Complexity PT evaluation 79087  [] High Complexity PT evaluation 21473  [] PT Re-evaluation 73536  [] Gait training 40356 0 minutes  [] Manual therapy 91732 0 minutes  [x] Therapeutic activities 32318 25 minutes  [] Therapeutic exercises 01812 0 minutes  [] Neuromuscular reeducation 33776 0 minutes     Cristobal Moore, PT, DPT  [id number]

## 2022-04-18 NOTE — ED NOTES
Tried calling report on patient but was told they were in a huddle and could not take report at this time and I would need to call back. Patient placed in transport.      Kelsey Lott, RN  04/18/22 2638

## 2022-04-18 NOTE — CONSULTS
Department of Orthopedic Surgery  Resident Consult Note          Reason for Consult:  Left leg pain     HISTORY OF PRESENT ILLNESS:       Patient is a 65 y.o. male who presents with complaint of left lower extremity pain. He has no history of recent trauma to the leg. Approximately 7 days prior to presentation he sustained a twisting injury to the leg but no fall. At that time he felt a pop in the leg. He was able to ambulate afterwards. Around 1-3 PM on 4/17/2022 he noted worsening pain to the left lower extremity and increased difficulty with ambulation. He initially presented to [facility] as he had concerns for DVT. At that time he had CTA with runoff and ultrasound of the lower extremity which demonstrated a large fluid collection consistent with hematoma. Ultimately the case was discussed with vascular surgery and decision was made to transfer the patient to [facility]. Orthopedic surgery was consulted for evaluation. Prior to my examination today he had 1 mg of Dilaudid approximately 3 hours ago. He states his pain is currently 4/10 at rest.  This is more severe with ambulation. He denies any changes in sensation to the left lower extremity compared to the right. He denies any chest pain or shortness of breath. No pain in any other extremities. No fevers or chills. No nausea or vomiting. He has history of left Achilles tendon injury with repair that was associated with skin complications ultimately requiring graft. He has had bilateral total knee arthroplasty. He has history of NSTEMI and takes Plavix regularly. No additional complaints at this time.     Past Medical History:        Diagnosis Date    Anxiety     Arthritis     rheumatoid arthritis    ASHD (arteriosclerotic heart disease)     Chronic anemia     Chronic kidney disease, stage III (moderate) (HCC)     Depression     GERD (gastroesophageal reflux disease)     Hyperlipidemia     MI ([facility]ocardial infarction) ([facility] [address]     NSTEMI 7/2000    Other ulcerative colitis     BY BIOPSY    Psoriatic arthritis ([facility] [address]     Sleep apnea      Past Surgical History:        Procedure Laterality Date   Harshad Gue[address]a [address] [facility]    CARDIAC CATHETERIZATION  01/04/2018    Dr. Elissa Lemus  06/15/2001    cabg x 2, DR Leny Mahajan, SVG-RCA    COLONOSCOPY      CORONARY ANGIOPLASTY WITH STENT PLACEMENT  06/2016    LUMBAR FUSION  02/2015    L4-S1   DR. ALVARO ACUNA [facility]    MALIGNANT SKIN LESION EXCISION Left 02/28/2017    VORTEX SCALP    OTHER SURGICAL HISTORY      lumbar puncture    TOTAL KNEE ARTHROPLASTY Right 12/2007    Right TKA. Dr. Fernando Miranda Left 07/2010    Left TKA.     UPPER GASTROINTESTINAL ENDOSCOPY  2007     Current Medications:   Current Facility-Administered Medications: diphenhydrAMINE (BENADRYL) injection 25 mg, 25 mg, IntraVENous, Once  Allergies:  Amoxicillin, Flagyl [metronidazole], and Pcn [penicillins]    Social History:   TOBACCO:   reports that he quit smoking about 43 years ago. His smoking use included cigarettes. He started smoking about 59 years ago. He has a 15.00 pack-year smoking history. He has never used smokeless tobacco.  ETOH:   reports current alcohol use of about 5.0 standard drinks of alcohol per week. DRUGS:   reports no history of drug use.   ACTIVITIES OF DAILY LIVING:    OCCUPATION:    Family History:       Problem Relation Age of Onset    Other Mother         sepsis    Diabetes Father     Heart Disease Father        REVIEW OF SYSTEMS:  CONSTITUTIONAL:  negative for  fevers, chills  EYES:  negative for blurred vision, visual disturbance  HEENT:  negative for  hearing loss, voice change  RESPIRATORY:  negative for  dyspnea, wheezing  CARDIOVASCULAR:  negative for  chest pain, palpitations  GASTROINTESTINAL:  negative for nausea, vomiting  GENITOURINARY:  negative for frequency, urinary incontinence  HEMATOLOGIC/LYMPHATIC:  negative for bleeding and petechiae  MUSCULOSKELETAL: See HPI  NEUROLOGICAL:  negative for headaches, dizziness  BEHAVIOR/PSYCH:  negative for increased agitation and anxiety    PHYSICAL EXAM:    VITALS:  /72   Pulse 74   Temp 97.4 °F (36.3 °C) (Oral)   Resp 16   Ht 6' (1.829 m)   Wt 240 lb (108.9 kg)   SpO2 96%   BMI 32.55 kg/m²     CONSTITUTIONAL: Alert, cooperative, in no acute distress    MUSCULOSKELETAL:    Left lower Extremity:  · Skin intact about the lower extremity, no ecchymosis, no erythema, no warmth. There is well-healed skin graft in the region of the Achilles tendon. · Swelling notable to the extremity compared to contralateral side most significant in the posterior compartment  · Compartments are swollen but compressible, there is edema over the of the mid calf region posteriorly, there is an area at the mid calf of induration which is very tender to palpation, the more proximal posterior compartment has no significant tenderness to palpation  · Anterior and lateral compartments are also swollen but compressible and non-tender  · Sensation is intact to the lower extremity in sural, saphenous, tibial, deep peroneal, and superficial peroneal nerve distributions which is equal to the unaffected right lower extremity  · Dorsalis pedis and posterior tibial pulses are dopplerable, posterior tibial pulse is palpable  · Active dorsiflexion and plantarflexion demonstrated, plantarflexion strength +4/5  · Passive dorsiflexion well-tolerated with some pain only at the extreme of dorsiflexion  · Patient ambulates with antalgic gait, weightbearing mostly through the heel      Secondary Exam:   · bilateralUE: No obvious signs of trauma. -TTP to fingers, hand, wrist, forearm, elbow, humerus, shoulder or clavicle. -- Patient able to flex/extend fingers, wrist, elbow and shoulder with active and passive ROM without pain, +2/4 Radial pulse, cap refill <3sec, +AIN/PIN/Radial/Ulnar/Median N, distal sensation grossly intact to C4-T1 dermatomes, compartments soft and compressible. · rightLE: No obvious signs of trauma. -TTP to foot, ankle, leg, knee, thigh, hip.-- Patient able to flex/extend toes, ankle, knee and hip with active and passive ROM without pain,+2/4 DP & PT pulses, cap refill <3sec, +5/5 PF/DF/EHL, distal sensation grossly intact to L4-S1 dermatomes, compartments soft and compressible. · Pelvis: -TTP, -Log roll, -Heel strike     DATA:    CBC:   Lab Results   Component Value Date    WBC 7.5 04/17/2022    RBC 3.60 04/17/2022    HGB 12.5 04/17/2022    HCT 37.6 04/17/2022    .4 04/17/2022    MCH 34.7 04/17/2022    MCHC 33.2 04/17/2022    RDW 13.7 04/17/2022     04/17/2022    MPV 11.3 04/17/2022     PT/INR:    Lab Results   Component Value Date    PROTIME 11.9 04/17/2022    PROTIME 11.9 03/10/2011    INR 1.1 04/17/2022       Radiology Review:  X-ray imaging of the left tibia and fibula reviewed. Images demonstrate no acute fractures or dislocations. There is calcification proximal to the insertion of the Achilles tendon. Total knee arthroplasty hardware is in place without evidence of loosening or complication. Surgical clips are present in the posterior medial aspect of the leg. Ultrasound and CT aorta with runoff reviewed. With regards to the lower extremities, images demonstrate a fluid collection consistent with hematoma in the posterior medial aspect of the calf. IMPRESSION:  · Left posterior medial lower leg hematoma  · History of bilateral total knee arthroplasty  · Left Achilles tendon repair with skin graft  · History of STEMI on Plavix    PLAN:  · Physical exam and imaging findings were discussed with the patient. His pain at this time is well controlled without recent IV pain medication. We discussed imaging findings of suspected hematoma.   Gentle compression was provided with an Ace bandage to the lower extremity to extend above the knee. The lower leg was elevated on blankets. · Weight bearing as tolerated left lower extremity, assistive devices as needed  · Pain medications PO recommended  · Maintain NPO status as we continue to monitor with serial examination  · No surgical interventions anticipated at this time from orthopedic surgery perspective. Appreciate the recommendations of vascular surgery. We will reevaluate his leg for any changes to ensure no worsening of findings.   · Discuss with attending

## 2022-04-18 NOTE — PROGRESS NOTES
Patient re-examined at bedside. Pain remains well controlled. He took 1 Norco since last evaluation. He states this was also for pain in his left hip due to the positioning of his leg in elevation. He has lower back pain at baseline. No changes in sensation. Physical exam demonstrates compartments to be swollen but diminished swelling from previous exams. He remains to have edema skin overlying the posterior calf with some swelling and induration involving the posterior calf musculature. Active dorsiflexion and plantarflexion intact to the ankle. Passive dorsiflexion well-tolerated. Sensation grossly intact and equivalent to contralateral side. Posterior tibial pulse appreciated. Patient continues to improve. Orthopedic surgery will sign off at this time. Recommend ambulation with physical therapy prior to any discharge to home given his initial difficulties with ambulation. He is weightbearing as tolerated but he may require assistive devices. Please contact orthopedic surgery with any further questions or concerns.

## 2022-04-21 ENCOUNTER — TELEPHONE (OUTPATIENT)
Dept: VASCULAR SURGERY | Age: 77
End: 2022-04-21

## 2022-05-09 ENCOUNTER — TELEPHONE (OUTPATIENT)
Dept: VASCULAR SURGERY | Age: 77
End: 2022-05-09

## 2022-05-10 ENCOUNTER — OFFICE VISIT (OUTPATIENT)
Dept: VASCULAR SURGERY | Age: 77
End: 2022-05-10
Payer: MEDICARE

## 2022-05-10 VITALS — SYSTOLIC BLOOD PRESSURE: 132 MMHG | DIASTOLIC BLOOD PRESSURE: 78 MMHG

## 2022-05-10 DIAGNOSIS — S70.12XA HEMATOMA OF LEFT THIGH, INITIAL ENCOUNTER: Primary | ICD-10-CM

## 2022-05-10 PROCEDURE — 1111F DSCHRG MED/CURRENT MED MERGE: CPT | Performed by: SURGERY

## 2022-05-10 PROCEDURE — 1036F TOBACCO NON-USER: CPT | Performed by: SURGERY

## 2022-05-10 PROCEDURE — 1123F ACP DISCUSS/DSCN MKR DOCD: CPT | Performed by: SURGERY

## 2022-05-10 PROCEDURE — 4040F PNEUMOC VAC/ADMIN/RCVD: CPT | Performed by: SURGERY

## 2022-05-10 PROCEDURE — G8417 CALC BMI ABV UP PARAM F/U: HCPCS | Performed by: SURGERY

## 2022-05-10 PROCEDURE — 99203 OFFICE O/P NEW LOW 30 MIN: CPT | Performed by: SURGERY

## 2022-05-10 PROCEDURE — G8427 DOCREV CUR MEDS BY ELIG CLIN: HCPCS | Performed by: SURGERY

## 2022-05-10 RX ORDER — CEPHALEXIN 500 MG/1
500 CAPSULE ORAL 4 TIMES DAILY
COMMUNITY

## 2022-05-10 NOTE — PROGRESS NOTES
Vascular Surgery Outpatient Consultation      Chief Complaint   Patient presents with    Surgical Consult     Follow up PVD       Reason for Consult: Hematoma    Requesting Physician:  Dr. Thuy Jackman:                The patient is a 65 y.o. male who is referred for evaluation of left thigh hematoma. The patient states that he strained his calf while turning at his home. He states that he did not feel that he injured anything seriously but did feel a pop sensation. Shortly afterwards he developed increased pain and swelling in the calf and presented to the emergency department. A CAT scan was performed that confirmed a calf hematoma. There was concern for poor perfusion of the feet bilaterally. The patient denies any history of peripheral vascular disease. Past Medical History:        Diagnosis Date    Anxiety     Arthritis     rheumatoid arthritis    ASHD (arteriosclerotic heart disease)     Chronic anemia     Chronic kidney disease, stage III (moderate) (HCC)     Depression     GERD (gastroesophageal reflux disease)     Hyperlipidemia     MI (myocardial infarction) ([facility] [address]     NSTEMI 7/2000    Other ulcerative colitis     BY BIOPSY    Psoriatic arthritis ([facility] [address]     Sleep apnea      Past Surgical History:        Procedure Laterality Date   Cyndee Aragon. [facility]    CARDIAC CATHETERIZATION  01/04/2018    Dr. Khalida York  06/15/2001    cabg x 2, DR Danica Anaya, SVG-RCA    COLONOSCOPY      CORONARY ANGIOPLASTY WITH STENT PLACEMENT  06/2016    LUMBAR FUSION  02/2015    L4-S1   DR. ALVARO ACUNA [facility]    MALIGNANT SKIN LESION EXCISION Left 02/28/2017    VORTEX SCALP    OTHER SURGICAL HISTORY      lumbar puncture    TOTAL KNEE ARTHROPLASTY Right 12/2007    Right TKA. Dr. Sydney Gomez Left 07/2010    Left TKA.       UPPER GASTROINTESTINAL ENDOSCOPY  2007     Current Medications:   Prior to Admission medications    Medication Sig Start Date End Date Taking? Authorizing Provider   cephALEXin (KEFLEX) 500 MG capsule Take 500 mg by mouth 4 times daily   Yes Historical Provider, MD   sildenafil (VIAGRA) 100 MG tablet Take 100 mg by mouth as needed 3/18/22  Yes Historical Provider, MD   zinc 50 MG TABS tablet Take 50 mg by mouth daily   Yes Historical Provider, MD   diphenhydrAMINE-APAP, sleep, (TYLENOL PM EXTRA STRENGTH)  MG tablet Take 2 tablets by mouth nightly as needed for Sleep   Yes Historical Provider, MD   Cholecalciferol (VITAMIN D3 PO) Take 100 mcg by mouth daily   Yes Historical Provider, MD   levothyroxine (SYNTHROID) 50 MCG tablet Take 50 mcg by mouth Daily   Yes Historical Provider, MD   rosuvastatin (CRESTOR) 10 MG tablet Take 10 mg by mouth daily  5/1/18  Yes Historical Provider, MD   clopidogrel (PLAVIX) 75 MG tablet Take 1 tablet by mouth daily 1/6/18  Yes Jose Roberto Hines MD   gabapentin (NEURONTIN) 300 MG capsule Take 300 mg by mouth 4 times daily. Yes Historical Provider, MD   metoprolol succinate (TOPROL XL) 25 MG extended release tablet Take 25 mg by mouth every morning   Yes Historical Provider, MD   FLUoxetine (PROZAC) 10 MG tablet Take 10 mg by mouth every morning    Yes Historical Provider, MD   LORazepam (ATIVAN) 0.5 MG tablet Take 0.25-0.5 mg by mouth 3 times daily as needed for Anxiety . Yes Historical Provider, MD   methotrexate 2.5 MG tablet Take 15 mg by mouth once a week Monday   Yes Historical Provider, MD   leucovorin (WELLCOVORIN) 5 MG tablet Take 10 mg by mouth once a week.  tuesday    Yes Historical Provider, MD   omeprazole (PRILOSEC) 20 MG capsule Take 20 mg by mouth every morning    Yes Historical Provider, MD   aspirin 81 MG EC tablet Take 81 mg by mouth every morning   Patient not taking: Reported on 5/10/2022    Historical Provider, MD     Allergies:  Amoxicillin, Flagyl [metronidazole], and Pcn [penicillins]    Social History     Socioeconomic History    Marital status:      Spouse name: Not on file    Number of children: Not on file    Years of education: Not on file    Highest education level: Not on file   Occupational History    Occupation: retired- manager     Comment: [employer]   Tobacco Use    Smoking status: Former Smoker     Packs/day: 1.00     Years: 15.00     Pack years: 15.00     Types: Cigarettes     Start date:      Quit date: 1978     Years since quittin.0    Smokeless tobacco: Never Used   Vaping Use    Vaping Use: Never used   Substance and Sexual Activity    Alcohol use: Yes     Alcohol/week: 5.0 standard drinks     Types: 5 Shots of liquor per week     Comment: 1-2 beers a day    Drug use: No    Sexual activity: Not Currently   Other Topics Concern    Not on file   Social History Narrative    Not on file     Social Determinants of Health     Financial Resource Strain:     Difficulty of Paying Living Expenses: Not on file   Food Insecurity:     Worried About Running Out of Food in the Last Year: Not on file    Chuy of Food in the Last Year: Not on file   Transportation Needs:     Lack of Transportation (Medical): Not on file    Lack of Transportation (Non-Medical):  Not on file   Physical Activity:     Days of Exercise per Week: Not on file    Minutes of Exercise per Session: Not on file   Stress:     Feeling of Stress : Not on file   Social Connections:     Frequency of Communication with Friends and Family: Not on file    Frequency of Social Gatherings with Friends and Family: Not on file    Attends Mormonism Services: Not on file    Active Member of Clubs or Organizations: Not on file    Attends Club or Organization Meetings: Not on file    Marital Status: Not on file   Intimate Partner Violence:     Fear of Current or Ex-Partner: Not on file    Emotionally Abused: Not on file    Physically Abused: Not on file    Sexually Abused: Not on file   Housing Stability:     Unable to Pay for Housing in the Last Year: Not on file    Number of Places Lived in the Last Year: Not on file    Unstable Housing in the Last Year: Not on file        Family History   Problem Relation Age of Onset    Other Mother         sepsis    Diabetes Father     Heart Disease Father        REVIEW OF SYSTEMS (New symptoms):    Eyes:      Blurred vision:  No [x]/Yes []               Diplopia:   No [x]/Yes []               Vision loss:       No [x]/Yes []   Ears, nose, throat:             Hearing loss:    No [x]/Yes []      Vertigo:   No [x]/Yes []                       Swallowing problem:  No [x]/Yes []               Nose bleeds:   No [x]/Yes []      Voice hoarseness:  No [x]/Yes []  Respiratory:             Cough:   No [x]/Yes []      Pleuritic chest pain:  No [x]/Yes []                        Dyspnea:   No [x]/Yes []      Wheezing:   No [x]/Yes []  Cardiovascular:             Angina:   No [x]/Yes []      Palpitations:   No [x]/Yes []          Claudication:    No [x]/Yes []      Leg swelling:   No [x]/Yes []  Gastrointestinal:             Nausea or vomiting:  No [x]/Yes []               Abdominal pain:  No [x]/Yes []                     Intestinal bleeding: No [x]/Yes []  Musculoskeletal:             Leg pain:   No []/Yes [x]      Back pain:   No [x]/Yes []                    Weakness:   No [x]/Yes []  Neurologic:             Numbness:   No [x]/Yes []      Paralysis:   No [x]/Yes []                       Headaches:   No [x]/Yes []  Hematologic, lymphatic:   Anemia:   No [x]/Yes []              Bleeding or bruising:  No [x]/Yes []              Fevers or chills: No [x]/Yes []  Endocrine:             Temp intolerance:   No [x]/Yes []                       Polydipsia, polyuria:  No [x]/Yes []  Skin:              Rash:    No [x]/Yes []      Ulcers:   No [x]/Yes []              Abnorm pigment: No [x]/Yes []  :              Frequency/urgency:  No [x]/Yes []      Hematuria:    No [x]/Yes [] Incontinence:    No [x]/Yes []    PHYSICAL EXAM:  Vitals:    05/10/22 1547   BP: 132/78     General Appearance: alert and oriented to person, place and time, well developed and well- nourished, in no acute distress  Skin: warm and dry, no rash or erythema  Head: normocephalic and atraumatic  Eyes: extraocular eye movements intact, conjunctivae normal  ENT: external ear and ear canal normal bilaterally, nose without deformity  Pulmonary/Chest: clear to auscultation bilaterally- no wheezes, rales or rhonchi, normal air movement, no respiratory distress  Cardiovascular: normal rate, regular rhythm, normal S1 and S2, no murmurs, no carotid bruits  Abdomen: soft, non-tender, non-distended, normal bowel sounds, no masses or organomegaly  Musculoskeletal: normal range of motion, no joint swelling, deformity or tenderness  Neurologic: no cranial nerve deficit, gait, coordination and speech normal  Extremities: left calf swelling    PULSE EXAM      Right      Left   Brachial     Radial     Femoral     Popliteal     Dorsalis Pedis     Posterior Tibial 3 3   (3=normal, 2=diminished, 1=barely palpable, 4=widened)      Problem List Items Addressed This Visit     None      Visit Diagnoses     Hematoma of left thigh, initial encounter    -  Primary            I reviewed with the patient that the circulation to both feet remains very good and that he does not have arterial disease. I am sure that the muscle hematoma was secondary to a muscle strain that resulted in bleeding that did not clot initially due to his Plavix. I do not feel he requires any additional testing or intervention. I asked him to keep his leg elevated when sitting. I asked him to call with any worsening symptoms or changes and I would be happy to see him again. No follow-ups on file.

## 2023-04-12 ENCOUNTER — TELEPHONE (OUTPATIENT)
Dept: SURGERY | Age: 78
End: 2023-04-12

## 2023-04-12 PROBLEM — C44.311 BASAL CELL CARCINOMA OF NOSE: Status: ACTIVE | Noted: 2023-04-12

## 2023-04-17 ENCOUNTER — ANESTHESIA EVENT (OUTPATIENT)
Dept: OPERATING ROOM | Age: 78
End: 2023-04-17
Payer: MEDICARE

## 2023-04-17 NOTE — ANESTHESIA PRE PROCEDURE
Department of Anesthesiology  Preprocedure Note       Name:  Juan Michel Doctor   Age:  77 y.o.  :  1945                                          MRN:  [id number]         Date:  2023      Surgeon: Louis Cabezas):  Ramses Corbett MD    Procedure: Procedure(s):  EXCISION OF BASAL CELL CARCINOMA OF LEFT LATERAL NOSE WITH POSSIBLE LOCAL TISSUE REARRANGEMENT, POSSIBLE FULL THICKNESS SKIN GRAFT - FROZEN    Medications prior to admission:   Prior to Admission medications    Medication Sig Start Date End Date Taking? Authorizing Provider   turmeric 500 MG CAPS Take by mouth daily    Historical Provider, MD   cephALEXin (KEFLEX) 500 MG capsule Take 500 mg by mouth 4 times daily  Patient not taking: Reported on 2022    Historical Provider, MD   sildenafil (VIAGRA) 100 MG tablet Take 1 tablet by mouth as needed 3/18/22   Historical Provider, MD   zinc 50 MG TABS tablet Take 1 tablet by mouth daily    Historical Provider, MD   diphenhydrAMINE-APAP, sleep, (TYLENOL PM EXTRA STRENGTH)  MG tablet Take 2 tablets by mouth nightly as needed for Sleep    Historical Provider, MD   Cholecalciferol (VITAMIN D3 PO) Take 100 mcg by mouth daily    Historical Provider, MD   levothyroxine (SYNTHROID) 50 MCG tablet Take 1 tablet by mouth Daily    Historical Provider, MD   rosuvastatin (CRESTOR) 10 MG tablet Take 1 tablet by mouth daily 18   Historical Provider, MD   clopidogrel (PLAVIX) 75 MG tablet Take 1 tablet by mouth daily 18   Anderson Alexis MD   gabapentin (NEURONTIN) 300 MG capsule Take 1 capsule by mouth 3 times daily. Historical Provider, MD   metoprolol succinate (TOPROL XL) 25 MG extended release tablet Take 1 tablet by mouth every morning    Historical Provider, MD   FLUoxetine (PROZAC) 10 MG tablet Take 1 tablet by mouth every morning    Historical Provider, MD   LORazepam (ATIVAN) 0.5 MG tablet Take 0.5-1 tablets by mouth 3 times daily as needed for Anxiety.     Historical Provider, MD   methotrexate

## 2023-04-18 ENCOUNTER — ANESTHESIA (OUTPATIENT)
Dept: OPERATING ROOM | Age: 78
End: 2023-04-18
Payer: MEDICARE

## 2023-04-18 ENCOUNTER — HOSPITAL ENCOUNTER (OUTPATIENT)
Age: 78
Setting detail: OUTPATIENT SURGERY
Discharge: HOME | End: 2023-04-18
Attending: PLASTIC SURGERY | Admitting: PLASTIC SURGERY
Payer: MEDICARE

## 2023-04-18 VITALS
HEIGHT: 72 IN | HEART RATE: 60 BPM | OXYGEN SATURATION: 92 % | DIASTOLIC BLOOD PRESSURE: 68 MMHG | RESPIRATION RATE: 20 BRPM | BODY MASS INDEX: 31.76 KG/M2 | TEMPERATURE: 97 F | SYSTOLIC BLOOD PRESSURE: 115 MMHG | WEIGHT: 234.5 LBS

## 2023-04-18 DIAGNOSIS — C44.311 BASAL CELL CARCINOMA OF NOSE: ICD-10-CM

## 2023-04-18 DIAGNOSIS — G89.18 POST-OP PAIN: Primary | ICD-10-CM

## 2023-04-18 PROCEDURE — 3700000001 HC ADD 15 MINUTES (ANESTHESIA): Performed by: PLASTIC SURGERY

## 2023-04-18 PROCEDURE — 7100000011 HC PHASE II RECOVERY - ADDTL 15 MIN: Performed by: PLASTIC SURGERY

## 2023-04-18 PROCEDURE — 2580000003 HC RX 258: Performed by: PHYSICIAN ASSISTANT

## 2023-04-18 PROCEDURE — 3700000000 HC ANESTHESIA ATTENDED CARE: Performed by: PLASTIC SURGERY

## 2023-04-18 PROCEDURE — 2500000003 HC RX 250 WO HCPCS: Performed by: PLASTIC SURGERY

## 2023-04-18 PROCEDURE — 3600000002 HC SURGERY LEVEL 2 BASE: Performed by: PLASTIC SURGERY

## 2023-04-18 PROCEDURE — 14060 TIS TRNFR E/N/E/L 10 SQ CM/<: CPT | Performed by: PLASTIC SURGERY

## 2023-04-18 PROCEDURE — 88305 TISSUE EXAM BY PATHOLOGIST: CPT

## 2023-04-18 PROCEDURE — 6360000002 HC RX W HCPCS

## 2023-04-18 PROCEDURE — 6360000002 HC RX W HCPCS: Performed by: PHYSICIAN ASSISTANT

## 2023-04-18 PROCEDURE — 7100000010 HC PHASE II RECOVERY - FIRST 15 MIN: Performed by: PLASTIC SURGERY

## 2023-04-18 PROCEDURE — 2709999900 HC NON-CHARGEABLE SUPPLY: Performed by: PLASTIC SURGERY

## 2023-04-18 PROCEDURE — 88331 PATH CONSLTJ SURG 1 BLK 1SPC: CPT

## 2023-04-18 PROCEDURE — 3600000012 HC SURGERY LEVEL 2 ADDTL 15MIN: Performed by: PLASTIC SURGERY

## 2023-04-18 PROCEDURE — 6370000000 HC RX 637 (ALT 250 FOR IP): Performed by: PLASTIC SURGERY

## 2023-04-18 RX ORDER — PROCHLORPERAZINE EDISYLATE 5 MG/ML
5 INJECTION INTRAMUSCULAR; INTRAVENOUS
Status: CANCELLED | OUTPATIENT
Start: 2023-04-18 | End: 2023-04-19

## 2023-04-18 RX ORDER — SODIUM CHLORIDE 9 MG/ML
INJECTION, SOLUTION INTRAVENOUS PRN
Status: DISCONTINUED | OUTPATIENT
Start: 2023-04-18 | End: 2023-04-18 | Stop reason: HOSPADM

## 2023-04-18 RX ORDER — CLINDAMYCIN HYDROCHLORIDE 300 MG/1
300 CAPSULE ORAL 3 TIMES DAILY
Qty: 15 CAPSULE | Refills: 0 | Status: SHIPPED | OUTPATIENT
Start: 2023-04-18 | End: 2023-04-23

## 2023-04-18 RX ORDER — BACITRACIN ZINC 500 [USP'U]/G
OINTMENT TOPICAL PRN
Status: DISCONTINUED | OUTPATIENT
Start: 2023-04-18 | End: 2023-04-18 | Stop reason: ALTCHOICE

## 2023-04-18 RX ORDER — PROPOFOL 10 MG/ML
INJECTION, EMULSION INTRAVENOUS CONTINUOUS PRN
Status: DISCONTINUED | OUTPATIENT
Start: 2023-04-18 | End: 2023-04-18 | Stop reason: SDUPTHER

## 2023-04-18 RX ORDER — SODIUM CHLORIDE 0.9 % (FLUSH) 0.9 %
5-40 SYRINGE (ML) INJECTION EVERY 12 HOURS SCHEDULED
Status: DISCONTINUED | OUTPATIENT
Start: 2023-04-18 | End: 2023-04-18 | Stop reason: HOSPADM

## 2023-04-18 RX ORDER — GINSENG 100 MG
CAPSULE ORAL
Qty: 14 G | Refills: 0 | Status: SHIPPED | OUTPATIENT
Start: 2023-04-18

## 2023-04-18 RX ORDER — PYRAZINAMIDE 500 MG/1
1 TABLET ORAL EVERY 4 HOURS PRN
Qty: 10 TABLET | Refills: 0 | Status: SHIPPED | OUTPATIENT
Start: 2023-04-18 | End: 2023-04-23

## 2023-04-18 RX ORDER — SODIUM CHLORIDE 9 MG/ML
INJECTION, SOLUTION INTRAVENOUS CONTINUOUS
Status: DISCONTINUED | OUTPATIENT
Start: 2023-04-18 | End: 2023-04-18 | Stop reason: HOSPADM

## 2023-04-18 RX ORDER — LIDOCAINE HYDROCHLORIDE AND EPINEPHRINE 10; 10 MG/ML; UG/ML
INJECTION, SOLUTION INFILTRATION; PERINEURAL PRN
Status: DISCONTINUED | OUTPATIENT
Start: 2023-04-18 | End: 2023-04-18 | Stop reason: ALTCHOICE

## 2023-04-18 RX ORDER — SODIUM CHLORIDE 0.9 % (FLUSH) 0.9 %
5-40 SYRINGE (ML) INJECTION EVERY 12 HOURS SCHEDULED
Status: CANCELLED | OUTPATIENT
Start: 2023-04-18

## 2023-04-18 RX ORDER — DIPHENHYDRAMINE HYDROCHLORIDE 50 MG/ML
12.5 INJECTION INTRAMUSCULAR; INTRAVENOUS
Status: CANCELLED | OUTPATIENT
Start: 2023-04-18 | End: 2023-04-19

## 2023-04-18 RX ORDER — LIDOCAINE HYDROCHLORIDE 20 MG/ML
INJECTION, SOLUTION INTRAVENOUS PRN
Status: DISCONTINUED | OUTPATIENT
Start: 2023-04-18 | End: 2023-04-18 | Stop reason: SDUPTHER

## 2023-04-18 RX ORDER — SODIUM CHLORIDE 0.9 % (FLUSH) 0.9 %
5-40 SYRINGE (ML) INJECTION PRN
Status: CANCELLED | OUTPATIENT
Start: 2023-04-18

## 2023-04-18 RX ORDER — FENTANYL CITRATE 50 UG/ML
INJECTION, SOLUTION INTRAMUSCULAR; INTRAVENOUS PRN
Status: DISCONTINUED | OUTPATIENT
Start: 2023-04-18 | End: 2023-04-18 | Stop reason: SDUPTHER

## 2023-04-18 RX ORDER — SODIUM CHLORIDE 0.9 % (FLUSH) 0.9 %
5-40 SYRINGE (ML) INJECTION PRN
Status: DISCONTINUED | OUTPATIENT
Start: 2023-04-18 | End: 2023-04-18 | Stop reason: HOSPADM

## 2023-04-18 RX ORDER — MEPERIDINE HYDROCHLORIDE 25 MG/ML
12.5 INJECTION INTRAMUSCULAR; INTRAVENOUS; SUBCUTANEOUS ONCE
Status: CANCELLED | OUTPATIENT
Start: 2023-04-18 | End: 2023-04-18

## 2023-04-18 RX ORDER — FENTANYL CITRATE 50 UG/ML
50 INJECTION, SOLUTION INTRAMUSCULAR; INTRAVENOUS EVERY 5 MIN PRN
Status: CANCELLED | OUTPATIENT
Start: 2023-04-18

## 2023-04-18 RX ORDER — SODIUM CHLORIDE 9 MG/ML
INJECTION, SOLUTION INTRAVENOUS PRN
Status: CANCELLED | OUTPATIENT
Start: 2023-04-18

## 2023-04-18 RX ADMIN — FENTANYL CITRATE 20 MCG: 50 INJECTION, SOLUTION INTRAMUSCULAR; INTRAVENOUS at 08:13

## 2023-04-18 RX ADMIN — PROPOFOL 100 MCG/KG/MIN: 10 INJECTION, EMULSION INTRAVENOUS at 08:13

## 2023-04-18 RX ADMIN — CEFAZOLIN 2000 MG: 2 INJECTION, POWDER, FOR SOLUTION INTRAMUSCULAR; INTRAVENOUS at 08:17

## 2023-04-18 RX ADMIN — SODIUM CHLORIDE: 9 INJECTION, SOLUTION INTRAVENOUS at 07:44

## 2023-04-18 RX ADMIN — SODIUM CHLORIDE: 9 INJECTION, SOLUTION INTRAVENOUS at 06:41

## 2023-04-18 RX ADMIN — FENTANYL CITRATE 20 MCG: 50 INJECTION, SOLUTION INTRAMUSCULAR; INTRAVENOUS at 08:26

## 2023-04-18 RX ADMIN — LIDOCAINE HYDROCHLORIDE 50 MG: 20 INJECTION, SOLUTION INTRAVENOUS at 08:13

## 2023-04-18 ASSESSMENT — PAIN - FUNCTIONAL ASSESSMENT: PAIN_FUNCTIONAL_ASSESSMENT: NONE - DENIES PAIN

## 2023-04-18 ASSESSMENT — LIFESTYLE VARIABLES: SMOKING_STATUS: 0

## 2023-04-18 NOTE — ANESTHESIA POSTPROCEDURE EVALUATION
Department of Anesthesiology  Postprocedure Note    Patient: Fabio Boyer Doctor  MRN: [id number]  YOB: 1945  Date of evaluation: 4/18/2023      Procedure Summary     Date: 04/18/23 Room / Location: [facility] OR 10 / [facility]    Anesthesia Start: 0016 Anesthesia Stop: 7079    Procedure: EXCISION OF BASAL CELL CARCINOMA OF LEFT LATERAL NOSE WITH RHOMBOID FLAP CLOSURE (Left: Nose) Diagnosis:       Basal cell carcinoma of nose      (Basal cell carcinoma of nose [C44.311])    Surgeons: Sharmaine Luo MD Responsible Provider: Reyes Ship., MD    Anesthesia Type: MAC ASA Status: 3          Anesthesia Type: No value filed.     David Phase I: David Score: 10    David Phase II: David Score: 10      Anesthesia Post Evaluation    Patient location during evaluation: PACU  Patient participation: complete - patient participated  Level of consciousness: awake and alert  Airway patency: patent  Nausea & Vomiting: no vomiting and no nausea  Complications: no  Cardiovascular status: blood pressure returned to baseline  Respiratory status: acceptable  Hydration status: euvolemic  Multimodal analgesia pain management approach

## 2023-04-18 NOTE — OP NOTE
incision. The area was prepped and draped in the usual sterile fashion with Betadine paint. The area was marked and measured and 2-mm margins were taken circumferentially around the lesion. The area was then anesthetized with 1% lidocaine with 100,000 epinephrine and allowed to work. A 15 blade was used to incise full thickness through the skin and the lesion was sharply lifted off the underlying subcutaneous tissue. The specimen was marked and sent for frozen section with representative margins negative for tumor. Hemostasis was achieved with monopolar cautery. The area was assessed for the best manner of reconstruction. It was decided that the most optimal way to close the wound would be for local tissue rearrangement. A rhomboid flap was then chosen as the best option. The area was anesthetized in the same way. A 15 blade was used to incise the edges of the flap and it was sharply lifted off the underlying subcutaneous tissue. The flap was then transposed into the defect created by resection of the cancer. Donor site was closed primarily after hemostasis was achieved. The wound was closed with 5-0 Monocryl deep dermal and superficially with 5-0 fast gut suture. Bacitracin was applied. The patient emerged from anesthesia without complication and taken to PACU in good condition.       Electronically signed by Shobha Dorsey MD on 4/18/2023 at 8:56 AM

## 2023-04-18 NOTE — DISCHARGE INSTRUCTIONS
Discharge Home. Call office to schedule a follow-up appointment at [phone]  Please call to schedule an appointment to be seen In our office on Thursday 4-27-23  Diet: regular diet  Activity: ambulate in house and no Strenuous exercise for 1 week  Shower/Bathing: You can shower in 48 hours over the incision site. At that time you can allow soap and water to rinse off the incision site while showering. Once you are done in the shower you can pat dry the incision site with a clean paper towel. No baths, hot tubs or soaking of the wound site at this time. Dressings /Splint /Wound Care:Keep wound clean and dry. Apply bacitracin  to the wound site two times daily. Driving: It is OK to drive if the following criteria are met:   1- Not taking narcotic pain medication   2- Not distracted by pain or limited ROM   3- Not a hazard to self or others on the road  Medications: As prescribed. Educated to contact physician at [phone] with concerns or signs of infection (redness, increasing pain, discharge, odor, fever).

## 2023-04-18 NOTE — PROGRESS NOTES
INTRAOPERATIVE CONSULTATION (with FROZEN SECTION)   Skin left lateral nose: basal cell carcinoma, margins are free.

## 2023-04-27 ENCOUNTER — OFFICE VISIT (OUTPATIENT)
Dept: SURGERY | Age: 78
End: 2023-04-27

## 2023-04-27 VITALS — TEMPERATURE: 97.1 F

## 2023-04-27 DIAGNOSIS — C44.310 BCC (BASAL CELL CARCINOMA), FACE: Primary | ICD-10-CM

## 2023-04-27 PROCEDURE — 99024 POSTOP FOLLOW-UP VISIT: CPT | Performed by: PHYSICIAN ASSISTANT

## 2023-04-27 NOTE — PROGRESS NOTES
06:09 AM    GLUCOSE 102 03/10/2011 09:55 AM         Pathology Report- [address] [place] [address]      [address] Counts include 234 beds at the [facility]            [address] [place] [address], [address] [place] [address] 65   [facility], [facility], [address]               FINAL SURGICAL PATHOLOGY REPORT     NAME:            Audelia Mac          Date of       04/18/2023                                            Collection:   Medical Record   [id number]              Date of       04/18/2023   Number:                                  Receipt:   Age:  77 Y        Sex:  M                Date          04/19/2023 13:20                                            Reported:   YOB: 1945   Financial        [id number]             Admitting     [employer] [facility]   Number:                                  Physician:   Patient          Malcolm Pierre         Ordering      JUAN F WATT   Location:                                Physician:     Accession Number:  [id number]                                           Additional Physicians:JONATHAN PINA       Diagnosis:   Lesion, left lateral nose, excision: Basal cell carcinoma   Margins of excision negative for Clemente Mckeon M.D.                                        (Electronic Signature)       Plan:     Educated the pt on their pathology report. Pt voices understanding      Dressing -discontinue bacitracin at this time. Showering at this time -okay to shower over the wound site. I informed the patient that he can begin scar massage to the area in another 2 weeks.     Pt was instructed on scar massage  Scar Care Instructions      Sunscreen Recommendations for Scars  We

## 2023-05-09 PROBLEM — M47.812 ARTHRITIS OF NECK: Status: ACTIVE | Noted: 2020-09-15

## 2023-05-09 PROBLEM — H43.10 VITREOUS HEMORRHAGE (HCC): Status: ACTIVE | Noted: 2022-10-31

## 2024-06-16 ENCOUNTER — APPOINTMENT (OUTPATIENT)
Dept: GENERAL RADIOLOGY | Age: 79
End: 2024-06-16
Payer: MEDICARE

## 2024-06-16 ENCOUNTER — APPOINTMENT (OUTPATIENT)
Dept: CT IMAGING | Age: 79
End: 2024-06-16
Payer: MEDICARE

## 2024-06-16 ENCOUNTER — HOSPITAL ENCOUNTER (EMERGENCY)
Age: 79
Discharge: HOME | End: 2024-06-16
Attending: EMERGENCY MEDICINE
Payer: MEDICARE

## 2024-06-16 VITALS
DIASTOLIC BLOOD PRESSURE: 77 MMHG | SYSTOLIC BLOOD PRESSURE: 150 MMHG | HEIGHT: 72 IN | RESPIRATION RATE: 14 BRPM | WEIGHT: 225 LBS | OXYGEN SATURATION: 96 % | TEMPERATURE: 98.1 F | BODY MASS INDEX: 30.48 KG/M2 | HEART RATE: 61 BPM

## 2024-06-16 DIAGNOSIS — R42 LIGHTHEADEDNESS: Primary | ICD-10-CM

## 2024-06-16 DIAGNOSIS — E86.0 DEHYDRATION: ICD-10-CM

## 2024-06-16 DIAGNOSIS — N18.9 CHRONIC KIDNEY DISEASE, UNSPECIFIED CKD STAGE: ICD-10-CM

## 2024-06-16 LAB
ALBUMIN SERPL-MCNC: 4.5 G/DL (ref 3.5–5.2)
ALP SERPL-CCNC: 76 U/L (ref 40–129)
ALT SERPL-CCNC: 23 U/L (ref 0–40)
ANION GAP SERPL CALCULATED.3IONS-SCNC: 9 MMOL/L (ref 7–16)
AST SERPL-CCNC: 25 U/L (ref 0–39)
BASOPHILS # BLD: 0.06 K/UL (ref 0–0.2)
BASOPHILS NFR BLD: 1 % (ref 0–2)
BILIRUB SERPL-MCNC: 0.5 MG/DL (ref 0–1.2)
BILIRUB UR QL STRIP: NEGATIVE
BUN SERPL-MCNC: 27 MG/DL (ref 6–23)
CALCIUM SERPL-MCNC: 9.6 MG/DL (ref 8.6–10.2)
CHLORIDE SERPL-SCNC: 105 MMOL/L (ref 98–107)
CLARITY UR: CLEAR
CO2 SERPL-SCNC: 26 MMOL/L (ref 22–29)
COLOR UR: YELLOW
CREAT SERPL-MCNC: 1.5 MG/DL (ref 0.7–1.2)
EOSINOPHIL # BLD: 0.25 K/UL (ref 0.05–0.5)
EOSINOPHILS RELATIVE PERCENT: 5 % (ref 0–6)
ERYTHROCYTE [DISTWIDTH] IN BLOOD BY AUTOMATED COUNT: 14.6 % (ref 11.5–15)
GFR, ESTIMATED: 47 ML/MIN/1.73M2
GLUCOSE SERPL-MCNC: 94 MG/DL (ref 74–99)
GLUCOSE UR STRIP-MCNC: NEGATIVE MG/DL
HCT VFR BLD AUTO: 42.8 % (ref 37–54)
HGB BLD-MCNC: 13.7 G/DL (ref 12.5–16.5)
HGB UR QL STRIP.AUTO: NEGATIVE
IMM GRANULOCYTES # BLD AUTO: 0.03 K/UL (ref 0–0.58)
IMM GRANULOCYTES NFR BLD: 1 % (ref 0–5)
KETONES UR STRIP-MCNC: NEGATIVE MG/DL
LACTATE BLDV-SCNC: 1 MMOL/L (ref 0.5–2.2)
LEUKOCYTE ESTERASE UR QL STRIP: NEGATIVE
LYMPHOCYTES NFR BLD: 0.84 K/UL (ref 1.5–4)
LYMPHOCYTES RELATIVE PERCENT: 15 % (ref 20–42)
MCH RBC QN AUTO: 32.8 PG (ref 26–35)
MCHC RBC AUTO-ENTMCNC: 32 G/DL (ref 32–34.5)
MCV RBC AUTO: 102.4 FL (ref 80–99.9)
MONOCYTES NFR BLD: 0.49 K/UL (ref 0.1–0.95)
MONOCYTES NFR BLD: 9 % (ref 2–12)
NEUTROPHILS NFR BLD: 70 % (ref 43–80)
NEUTS SEG NFR BLD: 3.9 K/UL (ref 1.8–7.3)
NITRITE UR QL STRIP: NEGATIVE
PH UR STRIP: 6 [PH] (ref 5–9)
PLATELET, FLUORESCENCE: 126 K/UL (ref 130–450)
PMV BLD AUTO: 11 FL (ref 7–12)
POTASSIUM SERPL-SCNC: 4.5 MMOL/L (ref 3.5–5)
PROT SERPL-MCNC: 7.3 G/DL (ref 6.4–8.3)
PROT UR STRIP-MCNC: 30 MG/DL
RBC # BLD AUTO: 4.18 M/UL (ref 3.8–5.8)
RBC #/AREA URNS HPF: ABNORMAL /HPF
SODIUM SERPL-SCNC: 140 MMOL/L (ref 132–146)
SP GR UR STRIP: 1.01 (ref 1–1.03)
TROPONIN I SERPL HS-MCNC: 17 NG/L (ref 0–11)
TROPONIN I SERPL HS-MCNC: 19 NG/L (ref 0–11)
UROBILINOGEN UR STRIP-ACNC: 0.2 EU/DL (ref 0–1)
WBC #/AREA URNS HPF: ABNORMAL /HPF
WBC OTHER # BLD: 5.6 K/UL (ref 4.5–11.5)

## 2024-06-16 PROCEDURE — 96361 HYDRATE IV INFUSION ADD-ON: CPT

## 2024-06-16 PROCEDURE — 83605 ASSAY OF LACTIC ACID: CPT

## 2024-06-16 PROCEDURE — 81001 URINALYSIS AUTO W/SCOPE: CPT

## 2024-06-16 PROCEDURE — 2580000003 HC RX 258: Performed by: EMERGENCY MEDICINE

## 2024-06-16 PROCEDURE — 0042T CT BRAIN PERFUSION: CPT

## 2024-06-16 PROCEDURE — 70496 CT ANGIOGRAPHY HEAD: CPT

## 2024-06-16 PROCEDURE — 99285 EMERGENCY DEPT VISIT HI MDM: CPT

## 2024-06-16 PROCEDURE — 96360 HYDRATION IV INFUSION INIT: CPT

## 2024-06-16 PROCEDURE — 70450 CT HEAD/BRAIN W/O DYE: CPT

## 2024-06-16 PROCEDURE — 84484 ASSAY OF TROPONIN QUANT: CPT

## 2024-06-16 PROCEDURE — 70498 CT ANGIOGRAPHY NECK: CPT

## 2024-06-16 PROCEDURE — 93005 ELECTROCARDIOGRAM TRACING: CPT

## 2024-06-16 PROCEDURE — 99447 NTRPROF PH1/NTRNET/EHR 11-20: CPT | Performed by: PSYCHIATRY & NEUROLOGY

## 2024-06-16 PROCEDURE — 85025 COMPLETE CBC W/AUTO DIFF WBC: CPT

## 2024-06-16 PROCEDURE — 6360000004 HC RX CONTRAST MEDICATION: Performed by: RADIOLOGY

## 2024-06-16 PROCEDURE — 71045 X-RAY EXAM CHEST 1 VIEW: CPT

## 2024-06-16 PROCEDURE — 80053 COMPREHEN METABOLIC PANEL: CPT

## 2024-06-16 RX ORDER — 0.9 % SODIUM CHLORIDE 0.9 %
500 INTRAVENOUS SOLUTION INTRAVENOUS ONCE
Status: COMPLETED | OUTPATIENT
Start: 2024-06-16 | End: 2024-06-16

## 2024-06-16 RX ADMIN — IOPAMIDOL 75 ML: 755 INJECTION, SOLUTION INTRAVENOUS at 15:11

## 2024-06-16 RX ADMIN — SODIUM CHLORIDE 500 ML: 9 INJECTION, SOLUTION INTRAVENOUS at 14:05

## 2024-06-16 ASSESSMENT — PAIN - FUNCTIONAL ASSESSMENT: PAIN_FUNCTIONAL_ASSESSMENT: NONE - DENIES PAIN

## 2024-06-16 ASSESSMENT — LIFESTYLE VARIABLES
HOW MANY STANDARD DRINKS CONTAINING ALCOHOL DO YOU HAVE ON A TYPICAL DAY: 1 OR 2
HOW OFTEN DO YOU HAVE A DRINK CONTAINING ALCOHOL: 4 OR MORE TIMES A WEEK

## 2024-06-16 NOTE — ED PROVIDER NOTES
[facility] EMERGENCY DEPARTMENT  EMERGENCY DEPARTMENT ENCOUNTER        Pt Name: Stephen Mo  MRN: [id number]  Birthdate 1945  Date of evaluation: 6/16/2024  Provider: Janey Amezcua MD  PCP: Stephen Pennington MD  Note Started: 12:47 PM EDT 6/16/24    CHIEF COMPLAINT       Chief Complaint   Patient presents with    Dizziness     Worse with standing up started last night. Off balance and like room is closing in on him.        HISTORY OF PRESENT ILLNESS: 1 or more Elements   History From: patient    Limitations to history : None    Stephen Mo is a 79 y.o. male with a past medical history of hypertension, hyperlipidemia, CKD, obesity, MR, AR, basal cell carcinoma of nose who presents to the emergency department accompanied by his wife from home with a chief complaint of dizziness.  According to the patient he was doing fine in health until yesterday evening when he was outside grilling.  He brought the food inside the house and started feeling very dizzy, weak had a sensation of passing out.  Denies spinning of surrounding, chest pain, shortness of breath, changes in vision, weakness in any part of his body, abdominal pain, vomiting, nausea, diarrhea or constipation.  Went to bed last night and woke up in the morning and started feeling dizzy again.  Patient had difficulty maintaining his balance and was afraid he would sustain a fall.  Patient does have an extensive cardiac history in the past, had double bypass surgery in 2001 followed by stent placement in the next year.     Last echo 1/4/18: EF 55%, stage I diastolic dysfunction, mild concentric left ventricular hypertrophy (cardiologist: angel)      Nursing Notes were all reviewed and agreed with or any disagreements were addressed in the HPI.      REVIEW OF EXTERNAL NOTE :       External Notes reviewed    REVIEW OF SYSTEMS :           Positives and Pertinent negatives as per HPI.     SURGICAL HISTORY

## 2024-06-16 NOTE — PROGRESS NOTES
@1708 access center notified to speak with telestroke  Dizziness  LKW 7 pm last night  [facility] 0  @1730 Dr Gleason spoke with Dr Sanchez/ telestroke

## 2024-06-16 NOTE — VIRTUAL HEALTH
I discussed this case with Dr. Gleason.    Patient is an 78 yo man with a coronary artery history, who had some imbalance yesterday. This  morning continued to feel light headed.  NIHSS is currently 0.      He obtained a non contrast head CT, CTP, and CTAof the neck and head. He received iv fluids and reportedly feels better.    As vessels are clear and sxs improved with fluid and time, I agree that the presentation is not obvious for stroke. His CTA provides reassurance.  Cardiac work up may be relevant and defer urgency of this to the ED.      Total time spent on this encounter:  15 minutes    --Trae Gallego MD on 6/16/2024 at 5:22 PM    An electronic signature was used to authenticate this note.

## 2024-06-17 LAB
EKG ATRIAL RATE: 54 BPM
EKG P AXIS: 22 DEGREES
EKG P-R INTERVAL: 218 MS
EKG Q-T INTERVAL: 440 MS
EKG QRS DURATION: 142 MS
EKG QTC CALCULATION (BAZETT): 417 MS
EKG R AXIS: 1 DEGREES
EKG T AXIS: 7 DEGREES
EKG VENTRICULAR RATE: 54 BPM

## 2024-06-17 PROCEDURE — 93010 ELECTROCARDIOGRAM REPORT: CPT | Performed by: INTERNAL MEDICINE

## 2025-02-15 ENCOUNTER — APPOINTMENT (OUTPATIENT)
Dept: CT IMAGING | Age: 80
End: 2025-02-15
Payer: MEDICARE

## 2025-02-15 ENCOUNTER — HOSPITAL ENCOUNTER (EMERGENCY)
Age: 80
Discharge: HOME | End: 2025-02-15
Attending: EMERGENCY MEDICINE
Payer: MEDICARE

## 2025-02-15 VITALS
TEMPERATURE: 97.9 F | OXYGEN SATURATION: 96 % | RESPIRATION RATE: 19 BRPM | SYSTOLIC BLOOD PRESSURE: 141 MMHG | HEART RATE: 69 BPM | DIASTOLIC BLOOD PRESSURE: 79 MMHG

## 2025-02-15 DIAGNOSIS — M54.50 ACUTE EXACERBATION OF CHRONIC LOW BACK PAIN: Primary | ICD-10-CM

## 2025-02-15 DIAGNOSIS — M54.32 SCIATICA OF LEFT SIDE: ICD-10-CM

## 2025-02-15 DIAGNOSIS — G89.29 ACUTE EXACERBATION OF CHRONIC LOW BACK PAIN: Primary | ICD-10-CM

## 2025-02-15 PROCEDURE — 6370000000 HC RX 637 (ALT 250 FOR IP): Performed by: NURSE PRACTITIONER

## 2025-02-15 PROCEDURE — 72131 CT LUMBAR SPINE W/O DYE: CPT

## 2025-02-15 PROCEDURE — 99284 EMERGENCY DEPT VISIT MOD MDM: CPT

## 2025-02-15 RX ORDER — PREDNISONE 20 MG/1
60 TABLET ORAL ONCE
Status: COMPLETED | OUTPATIENT
Start: 2025-02-15 | End: 2025-02-15

## 2025-02-15 RX ORDER — PREDNISONE 10 MG/1
TABLET ORAL
Qty: 18 TABLET | Refills: 0 | Status: SHIPPED | OUTPATIENT
Start: 2025-02-15 | End: 2025-02-24

## 2025-02-15 RX ADMIN — PREDNISONE 60 MG: 20 TABLET ORAL at 12:19

## 2025-02-15 NOTE — ED PROVIDER NOTES
Independent MEGHAN Visit.      [facility]  Department of Emergency Medicine   ED  Encounter Note  Admit Date/RoomTime: 2/15/2025  2:49 PM  ED Room: PR4/FL4    NAME: Stephen Mo  : 1945  MRN: [id number]     Chief Complaint:  Back Pain (Lower back pain x 3 weeks. )    History of Present Illness       Stephen Mo is a 79 y.o. old male with a prior history of chronic back pain and prior spinal fusion of lumbar spine, presents to the emergency department by ambulance, for non-traumatic acute-on-chronic, sharp midline middle lumbar spine pain with radiation, to the left knee, for 3 week(s) prior to arrival.  There has been no recent injury as it relates to today's visit.   Since onset the symptoms have been gradually worsening and is mild-to-moderate in severity.  He has associated signs & symptoms of new sciatica on the left.   He denies any bladder or bowel incontinence , recent back injection, recent spinal surgery, recent spinal/chiropractic manipulation, history of IVDA, fever, abdominal pain, bladder retention, bladder urgency, bowel urgency, saddle paresthesias , or sacral numbness.   The pain is aggraveated by any movement and relieved by rest and walking with arch support on the left.  He is not currently enrolled in an pain management program or managed by PCP or back specialist.    ROS   Pertinent positives and negatives are stated within HPI, all other systems reviewed and are negative.    Past Medical History:  has a past medical history of Anxiety, Arthritis, ASHD (arteriosclerotic heart disease), Chronic anemia, Chronic kidney disease, stage III (moderate) ([facility]), Depression, GERD (gastroesophageal reflux disease), Hyperlipidemia, MI (myocardial infarction) ([facility]), Other ulcerative colitis, Psoriatic arthritis ([facility]), and Sleep apnea.    Surgical History:  has a past surgical history that includes Cardiac surgery (06/15/2001); other surgical history; Achilles tendon surgery  knee's and ankle's are normo-reflexive   Gait:  unable to be tested at this time.  Integument:  Normal turgor.  Warm, dry, without visible rash.  Lymphatics: No lymphangitis or adenopathy noted.  Neurological:  Oriented.  Motor functions intact.    Lab / Imaging Results   (All laboratory and radiology results have been personally reviewed by myself)  Labs:  No results found for this visit on 02/15/25.    Imaging:  All Radiology results interpreted by Radiologist unless otherwise noted.  CT LUMBAR SPINE WO CONTRAST   Final Result   1. No acute osseous abnormality.   2. Satisfactory alignment at level of posterior fusion.   3. Degenerative retrolisthesis of L3 on L4 with severe bilateral neural   foraminal narrowing at this level.           ED Course / Medical Decision Making     Medications   predniSONE (DELTASONE) tablet 60 mg (60 mg Oral Given 2/15/25 1219)        Re-examination:  2/15/25       Time: 1440   Patient’s condition is improving after treatment.    Consult(s):   None    Procedure(s):   none     Medical Decision Making    Stephen YATES Doctor is a 79 y.o. old male with a prior history of chronic back pain and prior spinal fusion of lumbar spine, presents to the emergency department by ambulance, for non-traumatic acute-on-chronic, sharp midline middle lumbar spine pain with radiation, to the left knee, for 3 week(s) prior to arrival.  There has been no recent injury as it relates to today's visit.   Since onset the symptoms have been gradually worsening and is mild-to-moderate in severity.  He has associated signs & symptoms of new sciatica on the left.   He denies any bladder or bowel incontinence , recent back injection, recent spinal surgery, recent spinal/chiropractic manipulation, history of IVDA, fever, abdominal pain, bladder retention, bladder urgency, bowel urgency, saddle paresthesias , or sacral numbness.   The pain is aggraveated by any movement and relieved by rest and walking with arch support on

## 2025-03-27 ENCOUNTER — APPOINTMENT (OUTPATIENT)
Dept: CT IMAGING | Age: 80
End: 2025-03-27
Payer: MEDICARE

## 2025-03-27 ENCOUNTER — HOSPITAL ENCOUNTER (INPATIENT)
Age: 80
LOS: 1 days | Discharge: ANOTHER HOSPITAL | End: 2025-03-28
Attending: EMERGENCY MEDICINE | Admitting: INTERNAL MEDICINE
Payer: MEDICARE

## 2025-03-27 ENCOUNTER — APPOINTMENT (OUTPATIENT)
Dept: GENERAL RADIOLOGY | Age: 80
End: 2025-03-27
Payer: MEDICARE

## 2025-03-27 DIAGNOSIS — I21.4 NSTEMI (NON-ST ELEVATED MYOCARDIAL INFARCTION) (HCC): Primary | ICD-10-CM

## 2025-03-27 LAB
ANION GAP SERPL CALCULATED.3IONS-SCNC: 14 MMOL/L (ref 7–16)
BASOPHILS # BLD: 0.08 K/UL (ref 0–0.2)
BASOPHILS NFR BLD: 1 % (ref 0–2)
BUN SERPL-MCNC: 28 MG/DL (ref 6–23)
CALCIUM SERPL-MCNC: 10.1 MG/DL (ref 8.6–10.2)
CHLORIDE SERPL-SCNC: 104 MMOL/L (ref 98–107)
CO2 SERPL-SCNC: 23 MMOL/L (ref 22–29)
CREAT SERPL-MCNC: 1.5 MG/DL (ref 0.7–1.2)
D-DIMER QUANTITATIVE: 311 NG/ML DDU (ref 0–230)
EOSINOPHIL # BLD: 0.27 K/UL (ref 0.05–0.5)
EOSINOPHILS RELATIVE PERCENT: 5 % (ref 0–6)
ERYTHROCYTE [DISTWIDTH] IN BLOOD BY AUTOMATED COUNT: 14.8 % (ref 11.5–15)
ERYTHROCYTE [DISTWIDTH] IN BLOOD BY AUTOMATED COUNT: 14.8 % (ref 11.5–15)
GFR, ESTIMATED: 45 ML/MIN/1.73M2
GLUCOSE SERPL-MCNC: 128 MG/DL (ref 74–99)
HCT VFR BLD AUTO: 41.7 % (ref 37–54)
HCT VFR BLD AUTO: 41.8 % (ref 37–54)
HGB BLD-MCNC: 13.3 G/DL (ref 12.5–16.5)
HGB BLD-MCNC: 13.5 G/DL (ref 12.5–16.5)
IMM GRANULOCYTES # BLD AUTO: 0.06 K/UL (ref 0–0.58)
IMM GRANULOCYTES NFR BLD: 1 % (ref 0–5)
LYMPHOCYTES NFR BLD: 0.72 K/UL (ref 1.5–4)
LYMPHOCYTES RELATIVE PERCENT: 12 % (ref 20–42)
MCH RBC QN AUTO: 34 PG (ref 26–35)
MCH RBC QN AUTO: 34 PG (ref 26–35)
MCHC RBC AUTO-ENTMCNC: 31.9 G/DL (ref 32–34.5)
MCHC RBC AUTO-ENTMCNC: 32.3 G/DL (ref 32–34.5)
MCV RBC AUTO: 105.3 FL (ref 80–99.9)
MCV RBC AUTO: 106.6 FL (ref 80–99.9)
MONOCYTES NFR BLD: 0.73 K/UL (ref 0.1–0.95)
MONOCYTES NFR BLD: 12 % (ref 2–12)
NEUTROPHILS NFR BLD: 69 % (ref 43–80)
NEUTS SEG NFR BLD: 4.08 K/UL (ref 1.8–7.3)
PARTIAL THROMBOPLASTIN TIME: 28.9 SEC (ref 24.5–35.1)
PARTIAL THROMBOPLASTIN TIME: 56.6 SEC (ref 24.5–35.1)
PLATELET, FLUORESCENCE: 127 K/UL (ref 130–450)
PLATELET, FLUORESCENCE: 133 K/UL (ref 130–450)
PMV BLD AUTO: 10.9 FL (ref 7–12)
PMV BLD AUTO: 11.3 FL (ref 7–12)
POTASSIUM SERPL-SCNC: 4.4 MMOL/L (ref 3.5–5)
RBC # BLD AUTO: 3.91 M/UL (ref 3.8–5.8)
RBC # BLD AUTO: 3.97 M/UL (ref 3.8–5.8)
SODIUM SERPL-SCNC: 141 MMOL/L (ref 132–146)
TROPONIN I SERPL HS-MCNC: 103 NG/L (ref 0–11)
TROPONIN I SERPL HS-MCNC: 25 NG/L (ref 0–11)
TROPONIN I SERPL HS-MCNC: 38 NG/L (ref 0–11)
WBC OTHER # BLD: 5.9 K/UL (ref 4.5–11.5)
WBC OTHER # BLD: 6.1 K/UL (ref 4.5–11.5)

## 2025-03-27 PROCEDURE — 5A09357 ASSISTANCE WITH RESPIRATORY VENTILATION, LESS THAN 24 CONSECUTIVE HOURS, CONTINUOUS POSITIVE AIRWAY PRESSURE: ICD-10-PCS | Performed by: INTERNAL MEDICINE

## 2025-03-27 PROCEDURE — 6370000000 HC RX 637 (ALT 250 FOR IP): Performed by: INTERNAL MEDICINE

## 2025-03-27 PROCEDURE — 71045 X-RAY EXAM CHEST 1 VIEW: CPT

## 2025-03-27 PROCEDURE — 94660 CPAP INITIATION&MGMT: CPT

## 2025-03-27 PROCEDURE — 6370000000 HC RX 637 (ALT 250 FOR IP): Performed by: EMERGENCY MEDICINE

## 2025-03-27 PROCEDURE — 2500000003 HC RX 250 WO HCPCS: Performed by: INTERNAL MEDICINE

## 2025-03-27 PROCEDURE — 99285 EMERGENCY DEPT VISIT HI MDM: CPT

## 2025-03-27 PROCEDURE — 6360000002 HC RX W HCPCS: Performed by: EMERGENCY MEDICINE

## 2025-03-27 PROCEDURE — 71275 CT ANGIOGRAPHY CHEST: CPT

## 2025-03-27 PROCEDURE — 99223 1ST HOSP IP/OBS HIGH 75: CPT | Performed by: INTERNAL MEDICINE

## 2025-03-27 PROCEDURE — 93005 ELECTROCARDIOGRAM TRACING: CPT | Performed by: EMERGENCY MEDICINE

## 2025-03-27 PROCEDURE — 85025 COMPLETE CBC W/AUTO DIFF WBC: CPT

## 2025-03-27 PROCEDURE — 85379 FIBRIN DEGRADATION QUANT: CPT

## 2025-03-27 PROCEDURE — 2060000000 HC ICU INTERMEDIATE R&B

## 2025-03-27 PROCEDURE — 84484 ASSAY OF TROPONIN QUANT: CPT

## 2025-03-27 PROCEDURE — 85730 THROMBOPLASTIN TIME PARTIAL: CPT

## 2025-03-27 PROCEDURE — 85027 COMPLETE CBC AUTOMATED: CPT

## 2025-03-27 PROCEDURE — 80048 BASIC METABOLIC PNL TOTAL CA: CPT

## 2025-03-27 PROCEDURE — 6360000004 HC RX CONTRAST MEDICATION: Performed by: RADIOLOGY

## 2025-03-27 PROCEDURE — 6370000000 HC RX 637 (ALT 250 FOR IP): Performed by: NURSE PRACTITIONER

## 2025-03-27 RX ORDER — HEPARIN SODIUM 10000 [USP'U]/100ML
5-30 INJECTION, SOLUTION INTRAVENOUS CONTINUOUS
Status: DISCONTINUED | OUTPATIENT
Start: 2025-03-27 | End: 2025-03-28 | Stop reason: HOSPADM

## 2025-03-27 RX ORDER — ROSUVASTATIN CALCIUM 20 MG/1
20 TABLET, COATED ORAL EVERY MORNING
Status: DISCONTINUED | OUTPATIENT
Start: 2025-03-28 | End: 2025-03-28 | Stop reason: HOSPADM

## 2025-03-27 RX ORDER — FLUOXETINE 10 MG/1
10 TABLET, FILM COATED ORAL EVERY MORNING
Status: DISCONTINUED | OUTPATIENT
Start: 2025-03-28 | End: 2025-03-28 | Stop reason: HOSPADM

## 2025-03-27 RX ORDER — ONDANSETRON 2 MG/ML
4 INJECTION INTRAMUSCULAR; INTRAVENOUS EVERY 6 HOURS PRN
Status: DISCONTINUED | OUTPATIENT
Start: 2025-03-27 | End: 2025-03-28 | Stop reason: HOSPADM

## 2025-03-27 RX ORDER — POTASSIUM CHLORIDE 1500 MG/1
40 TABLET, EXTENDED RELEASE ORAL PRN
Status: DISCONTINUED | OUTPATIENT
Start: 2025-03-27 | End: 2025-03-28 | Stop reason: HOSPADM

## 2025-03-27 RX ORDER — ASPIRIN 81 MG/1
324 TABLET, CHEWABLE ORAL ONCE
Status: COMPLETED | OUTPATIENT
Start: 2025-03-27 | End: 2025-03-27

## 2025-03-27 RX ORDER — IOPAMIDOL 755 MG/ML
75 INJECTION, SOLUTION INTRAVASCULAR
Status: COMPLETED | OUTPATIENT
Start: 2025-03-27 | End: 2025-03-27

## 2025-03-27 RX ORDER — SODIUM CHLORIDE 0.9 % (FLUSH) 0.9 %
5-40 SYRINGE (ML) INJECTION PRN
Status: DISCONTINUED | OUTPATIENT
Start: 2025-03-27 | End: 2025-03-28 | Stop reason: HOSPADM

## 2025-03-27 RX ORDER — HEPARIN SODIUM 1000 [USP'U]/ML
4000 INJECTION, SOLUTION INTRAVENOUS; SUBCUTANEOUS ONCE
Status: COMPLETED | OUTPATIENT
Start: 2025-03-27 | End: 2025-03-27

## 2025-03-27 RX ORDER — ASPIRIN 81 MG/1
81 TABLET, CHEWABLE ORAL DAILY
Status: DISCONTINUED | OUTPATIENT
Start: 2025-03-28 | End: 2025-03-28 | Stop reason: HOSPADM

## 2025-03-27 RX ORDER — PANTOPRAZOLE SODIUM 40 MG/1
40 TABLET, DELAYED RELEASE ORAL
Status: DISCONTINUED | OUTPATIENT
Start: 2025-03-28 | End: 2025-03-28 | Stop reason: HOSPADM

## 2025-03-27 RX ORDER — LEVOTHYROXINE SODIUM 75 UG/1
75 TABLET ORAL NIGHTLY
Status: DISCONTINUED | OUTPATIENT
Start: 2025-03-27 | End: 2025-03-28 | Stop reason: HOSPADM

## 2025-03-27 RX ORDER — POLYETHYLENE GLYCOL 3350 17 G/17G
17 POWDER, FOR SOLUTION ORAL DAILY PRN
Status: DISCONTINUED | OUTPATIENT
Start: 2025-03-27 | End: 2025-03-28 | Stop reason: HOSPADM

## 2025-03-27 RX ORDER — CLOPIDOGREL BISULFATE 75 MG/1
75 TABLET ORAL DAILY
Status: DISCONTINUED | OUTPATIENT
Start: 2025-03-27 | End: 2025-03-27

## 2025-03-27 RX ORDER — SODIUM CHLORIDE 0.9 % (FLUSH) 0.9 %
5-40 SYRINGE (ML) INJECTION EVERY 12 HOURS SCHEDULED
Status: DISCONTINUED | OUTPATIENT
Start: 2025-03-27 | End: 2025-03-28 | Stop reason: HOSPADM

## 2025-03-27 RX ORDER — ACETAMINOPHEN 650 MG/1
650 SUPPOSITORY RECTAL EVERY 6 HOURS PRN
Status: DISCONTINUED | OUTPATIENT
Start: 2025-03-27 | End: 2025-03-28 | Stop reason: HOSPADM

## 2025-03-27 RX ORDER — ACETAMINOPHEN 325 MG/1
650 TABLET ORAL EVERY 6 HOURS PRN
Status: DISCONTINUED | OUTPATIENT
Start: 2025-03-27 | End: 2025-03-28 | Stop reason: HOSPADM

## 2025-03-27 RX ORDER — ONDANSETRON 4 MG/1
4 TABLET, ORALLY DISINTEGRATING ORAL EVERY 8 HOURS PRN
Status: DISCONTINUED | OUTPATIENT
Start: 2025-03-27 | End: 2025-03-28 | Stop reason: HOSPADM

## 2025-03-27 RX ORDER — SODIUM CHLORIDE 9 MG/ML
INJECTION, SOLUTION INTRAVENOUS PRN
Status: DISCONTINUED | OUTPATIENT
Start: 2025-03-27 | End: 2025-03-28 | Stop reason: HOSPADM

## 2025-03-27 RX ORDER — HEPARIN SODIUM 1000 [USP'U]/ML
4000 INJECTION, SOLUTION INTRAVENOUS; SUBCUTANEOUS PRN
Status: DISCONTINUED | OUTPATIENT
Start: 2025-03-27 | End: 2025-03-28 | Stop reason: HOSPADM

## 2025-03-27 RX ORDER — POTASSIUM CHLORIDE 7.45 MG/ML
10 INJECTION INTRAVENOUS PRN
Status: DISCONTINUED | OUTPATIENT
Start: 2025-03-27 | End: 2025-03-28 | Stop reason: HOSPADM

## 2025-03-27 RX ORDER — HEPARIN SODIUM 1000 [USP'U]/ML
2000 INJECTION, SOLUTION INTRAVENOUS; SUBCUTANEOUS PRN
Status: DISCONTINUED | OUTPATIENT
Start: 2025-03-27 | End: 2025-03-28 | Stop reason: HOSPADM

## 2025-03-27 RX ORDER — MAGNESIUM SULFATE IN WATER 40 MG/ML
2000 INJECTION, SOLUTION INTRAVENOUS PRN
Status: DISCONTINUED | OUTPATIENT
Start: 2025-03-27 | End: 2025-03-28 | Stop reason: HOSPADM

## 2025-03-27 RX ORDER — GABAPENTIN 300 MG/1
300 CAPSULE ORAL 3 TIMES DAILY
Status: DISCONTINUED | OUTPATIENT
Start: 2025-03-27 | End: 2025-03-28 | Stop reason: HOSPADM

## 2025-03-27 RX ORDER — ASPIRIN 325 MG
325 TABLET ORAL DAILY
Status: DISCONTINUED | OUTPATIENT
Start: 2025-03-27 | End: 2025-03-27

## 2025-03-27 RX ORDER — METOPROLOL SUCCINATE 25 MG/1
25 TABLET, EXTENDED RELEASE ORAL DAILY
Status: DISCONTINUED | OUTPATIENT
Start: 2025-03-27 | End: 2025-03-28 | Stop reason: HOSPADM

## 2025-03-27 RX ORDER — CLOPIDOGREL BISULFATE 75 MG/1
75 TABLET ORAL DAILY
Status: DISCONTINUED | OUTPATIENT
Start: 2025-03-27 | End: 2025-03-28 | Stop reason: HOSPADM

## 2025-03-27 RX ADMIN — ASPIRIN 324 MG: 81 TABLET, CHEWABLE ORAL at 11:44

## 2025-03-27 RX ADMIN — HEPARIN SODIUM 4000 UNITS: 1000 INJECTION INTRAVENOUS; SUBCUTANEOUS at 13:17

## 2025-03-27 RX ADMIN — GABAPENTIN 300 MG: 300 CAPSULE ORAL at 20:04

## 2025-03-27 RX ADMIN — SODIUM CHLORIDE, PRESERVATIVE FREE 10 ML: 5 INJECTION INTRAVENOUS at 20:04

## 2025-03-27 RX ADMIN — HEPARIN SODIUM 9 UNITS/KG/HR: 10000 INJECTION, SOLUTION INTRAVENOUS at 13:21

## 2025-03-27 RX ADMIN — CLOPIDOGREL BISULFATE 75 MG: 75 TABLET, FILM COATED ORAL at 17:58

## 2025-03-27 RX ADMIN — LEVOTHYROXINE SODIUM 75 MCG: 0.07 TABLET ORAL at 20:04

## 2025-03-27 RX ADMIN — METOPROLOL SUCCINATE 25 MG: 25 TABLET, EXTENDED RELEASE ORAL at 17:58

## 2025-03-27 RX ADMIN — IOPAMIDOL 75 ML: 755 INJECTION, SOLUTION INTRAVENOUS at 10:34

## 2025-03-27 ASSESSMENT — ENCOUNTER SYMPTOMS
WHEEZING: 0
EYE DISCHARGE: 0
SHORTNESS OF BREATH: 1
DIARRHEA: 0
COUGH: 0
SORE THROAT: 0
EYE REDNESS: 0
EYE PAIN: 0
SINUS PRESSURE: 0
ABDOMINAL PAIN: 0
BACK PAIN: 0
NAUSEA: 0
VOMITING: 0

## 2025-03-27 ASSESSMENT — PAIN SCALES - GENERAL
PAINLEVEL_OUTOF10: 0
PAINLEVEL_OUTOF10: 0

## 2025-03-27 ASSESSMENT — LIFESTYLE VARIABLES
HOW MANY STANDARD DRINKS CONTAINING ALCOHOL DO YOU HAVE ON A TYPICAL DAY: PATIENT DOES NOT DRINK
HOW OFTEN DO YOU HAVE A DRINK CONTAINING ALCOHOL: NEVER

## 2025-03-27 ASSESSMENT — PAIN - FUNCTIONAL ASSESSMENT: PAIN_FUNCTIONAL_ASSESSMENT: NONE - DENIES PAIN

## 2025-03-27 NOTE — CONSULTS
Inpatient Cardiology Consultation      Reason for Consult: Non-ST elevation MI    Consulting Physician: Dr. Still    Requesting Physician:  Dr. Shields    Date of Consultation: 3/27/2025    HISTORY OF PRESENT ILLNESS:     This 80-year-old male is new to [facility] cardiology and is followed by Dr. Jackson of [facility] cardiology.  He has a history of CABG with stents pre and post CABG.  His last left heart cardiac catheterization was in 2018 and it was followed by medical management.  He was last evaluated as an outpatient about 6 months ago and everything was stable.  The patient believes that his last stress test was about 4 to 5 years ago and was unremarkable.    He has been in his usual state of health.  He is not very active due to chronic back pain.  This morning he woke up with chest burning that felt like \"cold air in my lungs \".  He thought he had pneumonia.  He had a chest discomfort that was retrosternal and he does not describe it very well.  It did radiate to his left shoulder and was somewhat different than when he had a non-ST elevation MI in the past.  He states at that time the pain radiated down his arm and did not stop at his shoulder.  He states he stayed calm and called his wife who drove him to the hospital.  The chest discomfort lasted over 30 minutes and was accompanied by a headache and dyspnea.  He was also slightly dizzy but had no presyncope.    Blood pressure on admission 139/86 and no hypoxia and no fever.    CTA pulmonary with no pulmonary embolism and minimal atelectasis and old granulomatous disease and vascular calcifications within the thoracic aorta and coronary artery calcification identified but no abnormality of the heart or pericardium or thoracic aorta and chest x-ray underlying chronic changes    EKG on admission with no acute changes with a heart rate of 60 bpm, right bundle branch block and left anterior fascicular block but unchanged from her previous EKG done last  03:50 PM     No results found for: \"EAG\"  proBNP:   Lab Results   Component Value Date/Time    PROBNP 175 05/23/2016 11:00 PM     PT/INR: No results for input(s): \"PROTIME\", \"INR\" in the last 72 hours.  APTT:No results for input(s): \"APTT\" in the last 72 hours.  TROPONIN:  Lab Results   Component Value Date/Time    TROPHS 38 03/27/2025 10:59 AM    TROPHS 25 03/27/2025 09:23 AM    TROPHS 17 06/16/2024 03:34 PM    TROPHS 19 06/16/2024 01:32 PM     CK:  Lab Results   Component Value Date/Time    CKTOTAL 513 01/04/2018 02:20 AM    CKTOTAL 536 01/03/2018 05:10 PM    CKTOTAL 396 05/24/2016 07:25 AM     FASTING LIPID PANEL:No results found for: \"CHOL\", \"HDL\", \"LDLDIRECT\", \"TRIG\"  LIVER PROFILE:No results for input(s): \"AST\", \"ALT\", \"LABALBU\" in the last 72 hours.  COVID19: No results for input(s): \"COVID19\" in the last 72 hours.    Impression and plan discussed with   Non-ST elevation MI with high-sensitivity troponin 25 --> 38 --> 103 and no acute PE on CTA pulmonary, no acute EKG changes  Coronary artery disease with a remote history of bare-metal stents to the LAD and RCA with restenosis and subsequent LIMA to the LAD and saphenous vein graft bypass surgery in 2000  2016 small non-ST elevation MI with chronically occluded LAD and RCA but while we patent LIMA to the LAD and saphenous vein graft to the RCA and new disease in the circumflex and status post 2 drug-eluting Promus stents  Non-ST elevation MI/left heart cardiac catheterization in 2018 with medical management advised with the left main normal and LAD 50% proximal stenosis and chronic occlusion at mid vessel and small tortuous diagonal subtotally occluded at 99%, circumflex 20 to 30% ostial stenosis and widely patent overlapping drug-eluting stents and dominant PDA normal and RCA with severe multifocal in-stent stenosis of 90 to 95% with widely patent large RV marginal branch and a 30% right sided PDA and the LIMA to the LAD widely patent and saphenous  Results   Component Value Date/Time    MG 1.8 11/06/2016 09:29 AM     No results for input(s): \"ALKPHOS\", \"ALT\", \"AST\", \"BILITOT\", \"BILIDIR\", \"LABALBU\" in the last 72 hours.    Invalid input(s): \"PROT\"  Recent Labs     03/27/25  0923 03/27/25  1314   WBC 5.9 6.1   RBC 3.97 3.91   HGB 13.5 13.3   HCT 41.8 41.7   .3* 106.6*   MCH 34.0 34.0   MCHC 32.3 31.9*   RDW 14.8 14.8   MPV 11.3 10.9     Lab Results   Component Value Date    CKTOTAL 513 (H) 01/04/2018    CKMB 54.8 (H) 01/04/2018    TROPONINI 2.49 (H) 01/04/2018    TROPONINI 1.25 (H) 01/03/2018    TROPONINI <0.01 01/03/2018     Recent Labs     03/27/25  0923 03/27/25  1059 03/27/25  1314   TROPHS 25* 38* 103*     Lab Results   Component Value Date    TSH 10.400 (H) 04/13/2014    T4FREE 0.94 04/13/2014      Lab Results   Component Value Date    LABA1C 5.8 04/13/2014     No results found for: \"EAG\"  No results found for: \"CHOL\"  No results found for: \"TRIG\"  No results found for: \"HDL\"  No components found for: \"LDLCALC\", \"LDLCHOLESTEROL\"  No results found for: \"VLDL\"  No results found for: \"CHOLHDLRATIO\"  No results for input(s): \"PROBNP\" in the last 72 hours.  Lab Results   Component Value Date    SEDRATE 8 04/17/2022     Lab Results   Component Value Date    CRP 0.3 04/17/2022       Cardiac Tests:  EKG personally reviewed: SR, rate 60, RBBB, LAFB    Telemetry personally reviewed (date: 3/27/2025): SR, rate 60's    Currently CP free  Will arrange for cardiac catheterization at [facility] -- R/B/A/I discussed with the patient and he agrees to proceed (scheduled for tomorrow)  Antiplatelet therapy, statin, Toprol XL  Heparin drip  Monitor labs (+CKD)  Aggressive risk factor modifications  Telemetry reviewed (SR)  Case discussed with the patient and his wife today    Dom Still MD  [facility]

## 2025-03-27 NOTE — ED PROVIDER NOTES
80-year-old male history of coronary artery disease with previous history of open heart surgery presents to the emergency department with chest pain diaphoresis radiation to the left arm and mild shortness of breath patient reports no fevers or chills abdominal pain leg swelling.  He states no other concerns or complaints at this time.    The history is provided by the patient.   Chest Pain  Pain location:  Substernal area  Pain quality: aching    Pain radiates to:  Does not radiate  Pain severity:  Moderate  Onset quality:  Gradual  Duration:  12 hours  Timing:  Intermittent  Progression:  Waxing and waning  Chronicity:  New  Relieved by:  Nothing  Worsened by:  Nothing  Ineffective treatments:  None tried  Associated symptoms: headache and shortness of breath    Associated symptoms: no abdominal pain, no back pain, no cough, no fever, no nausea, no vomiting and no weakness    Risk factors: coronary artery disease         Review of Systems   Constitutional:  Negative for chills and fever.   HENT:  Negative for ear pain, sinus pressure and sore throat.    Eyes:  Negative for pain, discharge and redness.   Respiratory:  Positive for shortness of breath. Negative for cough and wheezing.    Cardiovascular:  Positive for chest pain.   Gastrointestinal:  Negative for abdominal pain, diarrhea, nausea and vomiting.   Genitourinary:  Negative for dysuria and frequency.   Musculoskeletal:  Negative for arthralgias and back pain.   Skin:  Negative for rash and wound.   Neurological:  Positive for headaches. Negative for weakness.   Hematological:  Negative for adenopathy.   All other systems reviewed and are negative.       Physical Exam  Constitutional:       Appearance: Normal appearance. He is ill-appearing.   HENT:      Head: Normocephalic and atraumatic.      Nose: Nose normal.      Mouth/Throat:      Mouth: Mucous membranes are moist.   Eyes:      Extraocular Movements: Extraocular movements intact.      Pupils:  Range    Ventricular Rate 60 BPM    Atrial Rate 60 BPM    P-R Interval 180 ms    QRS Duration 150 ms    Q-T Interval 436 ms    QTc Calculation (Bazett) 436 ms    P Axis -6 degrees    R Axis -53 degrees    T Axis 47 degrees       Radiology  CTA PULMONARY W CONTRAST  Result Date: 3/27/2025  EXAMINATION: CTA OF THE CHEST 3/27/2025 10:35 am TECHNIQUE: CTA of the chest was performed after the administration of intravenous contrast.  Multiplanar reformatted images are provided for review.  MIP images are provided for review. Automated exposure control, iterative reconstruction, and/or weight based adjustment of the mA/kV was utilized to reduce the radiation dose to as low as reasonably achievable. COMPARISON: None. HISTORY: ORDERING SYSTEM PROVIDED HISTORY: rule out pulmonary embolism TECHNOLOGIST PROVIDED HISTORY: Reason for exam:->rule out pulmonary embolism Additional Contrast?->1 FINDINGS: Pulmonary Arteries: Pulmonary arteries are adequately opacified for evaluation.  No evidence of intraluminal filling defect to suggest pulmonary embolism.  Main pulmonary artery is normal in caliber. Mediastinum: No evidence of mediastinal lymphadenopathy.  Thyroid is homogeneous in appearance.  No mediastinal mass.  Vascular calcifications seen within the thoracic aorta.  Coronary artery calcification identified. No pericardial effusion.  No bulky hilar or axillary lymphadenopathy.  The heart and pericardium demonstrate no acute abnormality.  There is no acute abnormality of the thoracic aorta. Lungs/pleura: The lungs are without acute process.  Minimal dependent atelectatic changes seen lung bases.  Some calcifications seen within the left lower lobe suggesting old healed granulomas disease.  No focal consolidation or pulmonary edema.  No evidence of pleural effusion or pneumothorax. Upper Abdomen: Limited images of the upper abdomen are unremarkable. Visualized upper abdomen reveals punctate calcifications seen within the spleen

## 2025-03-27 NOTE — PROGRESS NOTES
Database initiated. Patient is A&O independent from home with wife. States he uses no assistive devices and is RA at baseline but wears a c-pap at night. He has an infected toe on right foot and has started antibiotics which has caused some diarrhea.

## 2025-03-27 NOTE — ED NOTES
ED to Inpatient Handoff Report    Notified 4s Staff that electronic handoff available and patient ready for transport to room 437.    Safety Risks: None identified    Patient in Restraints: no    Constant Observer or Patient : no    Telemetry Monitoring Ordered :Yes      Cardiac Rhythm:  (byncdle branch block noted on ekg)    Order to transfer to unit without monitor:NO    Last MEWS: 04:15 Time completed: 04:21    Deterioration Index Score:   Predictive Model Details          22 (Normal)  Factor Value    Calculated 3/27/2025 16:21 62% Age 80 years old    Deterioration Index Model 11% Potassium 4.4 mmol/L     11% Respiratory rate 18     7% Systolic 140     5% BUN abnormal (28 mg/dL)     3% Sodium 141 mmol/L     1% Pulse oximetry 97 %     0% Pulse 68     0% WBC count 6.1 k/uL     0% Hematocrit 41.7 %     0% Temperature 98.3 °F (36.8 °C)        Vitals:    03/27/25 0913 03/27/25 1620   BP: 139/86 (!) 140/85   Pulse: 65 68   Resp: 20 18   Temp: 98.1 °F (36.7 °C) 98.3 °F (36.8 °C)   TempSrc: Oral Oral   SpO2: 98% 97%   Weight: 102.1 kg (225 lb)          Opportunity for questions and clarification was provided.

## 2025-03-27 NOTE — PROGRESS NOTES
4 Eyes Skin Assessment     NAME:  Stephen Mo  YOB: 1945  MEDICAL RECORD NUMBER:  [id number]    The patient is being assessed for  Admission    I agree that at least one RN has performed a thorough Head to Toe Skin Assessment on the patient. ALL assessment sites listed below have been assessed.      Areas assessed by both nurses:    Head, Face, Ears, Shoulders, Back, Chest, Arms, Elbows, Hands, Sacrum. Buttock, Coccyx, Ischium, and Legs. Feet and Heels        Does the Patient have a Wound? No noted wound(s)       Junior Prevention initiated by RN: Yes  Wound Care Orders initiated by RN: No    Pressure Injury (Stage 3,4, Unstageable, DTI, NWPT, and Complex wounds) if present, place Wound referral order by RN under : No    New Ostomies, if present place, Ostomy referral order under : No     Nurse 1 eSignature: Electronically signed by Krystal Torres RN on 3/27/25 at 5:18 PM EDT    **SHARE this note so that the co-signing nurse can place an eSignature**    Nurse 2 eSignature: Electronically signed by Shayla Hagan RN on 3/27/25 at 5:25 PM EDT

## 2025-03-27 NOTE — PROGRESS NOTES
Spoke with cath lab, Heart cath tomorrow @8am    [facility] Friday.   time is 7am

## 2025-03-28 ENCOUNTER — APPOINTMENT (OUTPATIENT)
Age: 80
DRG: 282 | End: 2025-03-28
Attending: INTERNAL MEDICINE
Payer: MEDICARE

## 2025-03-28 ENCOUNTER — HOSPITAL ENCOUNTER (INPATIENT)
Age: 80
LOS: 1 days | Discharge: HOME | DRG: 282 | End: 2025-03-29
Attending: INTERNAL MEDICINE | Admitting: STUDENT IN AN ORGANIZED HEALTH CARE EDUCATION/TRAINING PROGRAM
Payer: MEDICARE

## 2025-03-28 VITALS
HEIGHT: 72 IN | OXYGEN SATURATION: 94 % | RESPIRATION RATE: 18 BRPM | HEART RATE: 63 BPM | WEIGHT: 219.8 LBS | TEMPERATURE: 98.2 F | SYSTOLIC BLOOD PRESSURE: 127 MMHG | BODY MASS INDEX: 29.77 KG/M2 | DIASTOLIC BLOOD PRESSURE: 70 MMHG

## 2025-03-28 DIAGNOSIS — R07.9 CHEST PAIN, UNSPECIFIED TYPE: Primary | ICD-10-CM

## 2025-03-28 DIAGNOSIS — I25.9 CHEST PAIN DUE TO MYOCARDIAL ISCHEMIA, UNSPECIFIED ISCHEMIC CHEST PAIN TYPE: ICD-10-CM

## 2025-03-28 PROBLEM — Z95.820 S/P ANGIOPLASTY WITH STENT: Status: ACTIVE | Noted: 2025-03-28

## 2025-03-28 LAB
ABO + RH BLD: NORMAL
ACTIVATED CLOTTING TIME, LOW RANGE: 158 SEC
ACTIVATED CLOTTING TIME, LOW RANGE: 176 SEC
ACTIVATED CLOTTING TIME, LOW RANGE: 206 SEC
ACTIVATED CLOTTING TIME, LOW RANGE: 260 SEC
ALBUMIN SERPL-MCNC: 4.4 G/DL (ref 3.5–5.2)
ALP SERPL-CCNC: 87 U/L (ref 40–129)
ALT SERPL-CCNC: 38 U/L (ref 0–40)
ANION GAP SERPL CALCULATED.3IONS-SCNC: 14 MMOL/L (ref 7–16)
ARM BAND NUMBER: NORMAL
AST SERPL-CCNC: 47 U/L (ref 0–39)
BACTERIA URNS QL MICRO: ABNORMAL
BILIRUB SERPL-MCNC: 0.6 MG/DL (ref 0–1.2)
BILIRUB UR QL STRIP: NEGATIVE
BLOOD BANK SAMPLE EXPIRATION: NORMAL
BLOOD GROUP ANTIBODIES SERPL: NEGATIVE
BUN SERPL-MCNC: 23 MG/DL (ref 6–23)
CALCIUM SERPL-MCNC: 9.9 MG/DL (ref 8.6–10.2)
CHLORIDE SERPL-SCNC: 107 MMOL/L (ref 98–107)
CLARITY UR: CLEAR
CO2 SERPL-SCNC: 23 MMOL/L (ref 22–29)
COLOR UR: YELLOW
CREAT SERPL-MCNC: 1.4 MG/DL (ref 0.7–1.2)
CREAT UR-MCNC: 84.1 MG/DL (ref 40–278)
ECHO AO ASC DIAM: 4.1 CM
ECHO AR MAX VEL PISA: 4.8 M/S
ECHO AV AREA PEAK VELOCITY: 2.4 CM2
ECHO AV AREA VTI: 2.3 CM2
ECHO AV CUSP MM: 1.9 CM
ECHO AV MEAN GRADIENT: 4 MMHG
ECHO AV MEAN VELOCITY: 1 M/S
ECHO AV PEAK GRADIENT: 7 MMHG
ECHO AV PEAK VELOCITY: 1.3 M/S
ECHO AV REGURGITANT PHT: 690.5 MS
ECHO AV VELOCITY RATIO: 0.77
ECHO AV VTI: 29.4 CM
ECHO EST RA PRESSURE: 3 MMHG
ECHO LA DIAMETER: 4.3 CM
ECHO LA VOL A-L A2C: 68 ML (ref 18–58)
ECHO LA VOL A-L A4C: 76 ML (ref 18–58)
ECHO LA VOL MOD A2C: 66 ML (ref 18–58)
ECHO LA VOL MOD A4C: 70 ML (ref 18–58)
ECHO LA VOLUME AREA LENGTH: 74 ML
ECHO LV EF PHYSICIAN: 50 %
ECHO LV FRACTIONAL SHORTENING: 29 % (ref 28–44)
ECHO LV INTERNAL DIMENSION DIASTOLIC: 5.5 CM (ref 4.2–5.9)
ECHO LV INTERNAL DIMENSION SYSTOLIC: 3.9 CM
ECHO LV ISOVOLUMETRIC RELAXATION TIME (IVRT): 129.2 MS
ECHO LV IVSD: 1 CM (ref 0.6–1)
ECHO LV IVSS: 1.5 CM
ECHO LV MASS 2D: 227.4 G (ref 88–224)
ECHO LV POSTERIOR WALL DIASTOLIC: 1.1 CM (ref 0.6–1)
ECHO LV POSTERIOR WALL SYSTOLIC: 1.5 CM
ECHO LV RELATIVE WALL THICKNESS RATIO: 0.4
ECHO LVOT AREA: 3.1 CM2
ECHO LVOT AV VTI INDEX: 0.74
ECHO LVOT DIAM: 2 CM
ECHO LVOT MEAN GRADIENT: 2 MMHG
ECHO LVOT PEAK GRADIENT: 4 MMHG
ECHO LVOT PEAK VELOCITY: 1 M/S
ECHO LVOT SV: 68.5 ML
ECHO LVOT VTI: 21.8 CM
ECHO MV "A" WAVE DURATION: 156.9 MSEC
ECHO MV A VELOCITY: 0.86 M/S
ECHO MV AREA PHT: 2.9 CM2
ECHO MV AREA VTI: 3.5 CM2
ECHO MV E DECELERATION TIME (DT): 333.2 MS
ECHO MV E VELOCITY: 0.38 M/S
ECHO MV E/A RATIO: 0.44
ECHO MV LVOT VTI INDEX: 0.91
ECHO MV MAX VELOCITY: 0.9 M/S
ECHO MV MEAN GRADIENT: 1 MMHG
ECHO MV MEAN VELOCITY: 0.4 M/S
ECHO MV PEAK GRADIENT: 4 MMHG
ECHO MV PRESSURE HALF TIME (PHT): 74.7 MS
ECHO MV VTI: 19.8 CM
ECHO PV MAX VELOCITY: 1.2 M/S
ECHO PV MEAN GRADIENT: 3 MMHG
ECHO PV MEAN VELOCITY: 0.9 M/S
ECHO PV PEAK GRADIENT: 6 MMHG
ECHO PV VTI: 21.7 CM
ECHO PVEIN A DURATION: 129.2 MS
ECHO PVEIN A VELOCITY: 0.3 M/S
ECHO PVEIN PEAK D VELOCITY: 0.3 M/S
ECHO PVEIN PEAK S VELOCITY: 0.4 M/S
ECHO PVEIN S/D RATIO: 1.3
ECHO RIGHT VENTRICULAR SYSTOLIC PRESSURE (RVSP): 9 MMHG
ECHO RV INTERNAL DIMENSION: 4.1 CM
ECHO TV REGURGITANT MAX VELOCITY: 1.25 M/S
ECHO TV REGURGITANT PEAK GRADIENT: 6 MMHG
EKG ATRIAL RATE: 59 BPM
EKG ATRIAL RATE: 60 BPM
EKG P AXIS: -6 DEGREES
EKG P AXIS: 63 DEGREES
EKG P-R INTERVAL: 180 MS
EKG P-R INTERVAL: 192 MS
EKG Q-T INTERVAL: 436 MS
EKG Q-T INTERVAL: 438 MS
EKG QRS DURATION: 146 MS
EKG QRS DURATION: 150 MS
EKG QTC CALCULATION (BAZETT): 433 MS
EKG QTC CALCULATION (BAZETT): 436 MS
EKG R AXIS: -26 DEGREES
EKG R AXIS: -53 DEGREES
EKG T AXIS: 24 DEGREES
EKG T AXIS: 47 DEGREES
EKG VENTRICULAR RATE: 59 BPM
EKG VENTRICULAR RATE: 60 BPM
ERYTHROCYTE [DISTWIDTH] IN BLOOD BY AUTOMATED COUNT: 14.9 % (ref 11.5–15)
GFR, ESTIMATED: 51 ML/MIN/1.73M2
GLUCOSE SERPL-MCNC: 126 MG/DL (ref 74–99)
GLUCOSE UR STRIP-MCNC: NEGATIVE MG/DL
HCT VFR BLD AUTO: 42.6 % (ref 37–54)
HGB BLD-MCNC: 13.6 G/DL (ref 12.5–16.5)
HGB UR QL STRIP.AUTO: ABNORMAL
KETONES UR STRIP-MCNC: NEGATIVE MG/DL
LEUKOCYTE ESTERASE UR QL STRIP: NEGATIVE
MCH RBC QN AUTO: 33.7 PG (ref 26–35)
MCHC RBC AUTO-ENTMCNC: 31.9 G/DL (ref 32–34.5)
MCV RBC AUTO: 105.7 FL (ref 80–99.9)
NITRITE UR QL STRIP: NEGATIVE
PARTIAL THROMBOPLASTIN TIME: 46.4 SEC (ref 24.5–35.1)
PARTIAL THROMBOPLASTIN TIME: 69.7 SEC (ref 24.5–35.1)
PH UR STRIP: 6.5 [PH] (ref 5–8)
PLATELET, FLUORESCENCE: 129 K/UL (ref 130–450)
PMV BLD AUTO: 11.4 FL (ref 7–12)
POTASSIUM SERPL-SCNC: 3.9 MMOL/L (ref 3.5–5)
PROT SERPL-MCNC: 7 G/DL (ref 6.4–8.3)
PROT UR STRIP-MCNC: 100 MG/DL
RBC # BLD AUTO: 4.03 M/UL (ref 3.8–5.8)
RBC #/AREA URNS HPF: ABNORMAL /HPF
SODIUM SERPL-SCNC: 144 MMOL/L (ref 132–146)
SODIUM UR-SCNC: 116 MMOL/L
SP GR UR STRIP: 1.01 (ref 1–1.03)
UROBILINOGEN UR STRIP-ACNC: 0.2 EU/DL (ref 0–1)
UUN UR-MCNC: 550 MG/DL (ref 800–1666)
WBC #/AREA URNS HPF: ABNORMAL /HPF
WBC OTHER # BLD: 6.3 K/UL (ref 4.5–11.5)

## 2025-03-28 PROCEDURE — C9600 PERC DRUG-EL COR STENT SING: HCPCS | Performed by: INTERNAL MEDICINE

## 2025-03-28 PROCEDURE — 99152 MOD SED SAME PHYS/QHP 5/>YRS: CPT | Performed by: INTERNAL MEDICINE

## 2025-03-28 PROCEDURE — 2709999900 HC NON-CHARGEABLE SUPPLY: Performed by: INTERNAL MEDICINE

## 2025-03-28 PROCEDURE — 6370000000 HC RX 637 (ALT 250 FOR IP): Performed by: INTERNAL MEDICINE

## 2025-03-28 PROCEDURE — 93459 L HRT ART/GRFT ANGIO: CPT | Performed by: INTERNAL MEDICINE

## 2025-03-28 PROCEDURE — 96361 HYDRATE IV INFUSION ADD-ON: CPT

## 2025-03-28 PROCEDURE — 85027 COMPLETE CBC AUTOMATED: CPT

## 2025-03-28 PROCEDURE — 85730 THROMBOPLASTIN TIME PARTIAL: CPT

## 2025-03-28 PROCEDURE — 93010 ELECTROCARDIOGRAM REPORT: CPT | Performed by: INTERNAL MEDICINE

## 2025-03-28 PROCEDURE — 86901 BLOOD TYPING SEROLOGIC RH(D): CPT

## 2025-03-28 PROCEDURE — 84540 ASSAY OF URINE/UREA-N: CPT

## 2025-03-28 PROCEDURE — 81001 URINALYSIS AUTO W/SCOPE: CPT

## 2025-03-28 PROCEDURE — 2580000003 HC RX 258: Performed by: INTERNAL MEDICINE

## 2025-03-28 PROCEDURE — C1725 CATH, TRANSLUMIN NON-LASER: HCPCS | Performed by: INTERNAL MEDICINE

## 2025-03-28 PROCEDURE — 6360000002 HC RX W HCPCS: Performed by: EMERGENCY MEDICINE

## 2025-03-28 PROCEDURE — 93306 TTE W/DOPPLER COMPLETE: CPT

## 2025-03-28 PROCEDURE — C1887 CATHETER, GUIDING: HCPCS | Performed by: INTERNAL MEDICINE

## 2025-03-28 PROCEDURE — C9601 PERC DRUG-EL COR STENT BRAN: HCPCS | Performed by: INTERNAL MEDICINE

## 2025-03-28 PROCEDURE — 80053 COMPREHEN METABOLIC PANEL: CPT

## 2025-03-28 PROCEDURE — 2500000003 HC RX 250 WO HCPCS: Performed by: INTERNAL MEDICINE

## 2025-03-28 PROCEDURE — 93306 TTE W/DOPPLER COMPLETE: CPT | Performed by: INTERNAL MEDICINE

## 2025-03-28 PROCEDURE — G0378 HOSPITAL OBSERVATION PER HR: HCPCS

## 2025-03-28 PROCEDURE — 36415 COLL VENOUS BLD VENIPUNCTURE: CPT

## 2025-03-28 PROCEDURE — 027035Z DILATION OF CORONARY ARTERY, ONE ARTERY WITH TWO DRUG-ELUTING INTRALUMINAL DEVICES, PERCUTANEOUS APPROACH: ICD-10-PCS | Performed by: INTERNAL MEDICINE

## 2025-03-28 PROCEDURE — 86850 RBC ANTIBODY SCREEN: CPT

## 2025-03-28 PROCEDURE — C1874 STENT, COATED/COV W/DEL SYS: HCPCS | Performed by: INTERNAL MEDICINE

## 2025-03-28 PROCEDURE — 84300 ASSAY OF URINE SODIUM: CPT

## 2025-03-28 PROCEDURE — 93005 ELECTROCARDIOGRAM TRACING: CPT | Performed by: INTERNAL MEDICINE

## 2025-03-28 PROCEDURE — 84156 ASSAY OF PROTEIN URINE: CPT

## 2025-03-28 PROCEDURE — 82570 ASSAY OF URINE CREATININE: CPT

## 2025-03-28 PROCEDURE — 96360 HYDRATION IV INFUSION INIT: CPT

## 2025-03-28 PROCEDURE — C1769 GUIDE WIRE: HCPCS | Performed by: INTERNAL MEDICINE

## 2025-03-28 PROCEDURE — 86900 BLOOD TYPING SEROLOGIC ABO: CPT

## 2025-03-28 PROCEDURE — 6360000002 HC RX W HCPCS: Performed by: INTERNAL MEDICINE

## 2025-03-28 PROCEDURE — C1894 INTRO/SHEATH, NON-LASER: HCPCS | Performed by: INTERNAL MEDICINE

## 2025-03-28 PROCEDURE — 85347 COAGULATION TIME ACTIVATED: CPT

## 2025-03-28 PROCEDURE — 6360000004 HC RX CONTRAST MEDICATION: Performed by: INTERNAL MEDICINE

## 2025-03-28 PROCEDURE — 92937 PRQ TRLUML REVSC CAB GRF 1: CPT | Performed by: INTERNAL MEDICINE

## 2025-03-28 PROCEDURE — B2101ZZ FLUOROSCOPY OF SINGLE CORONARY ARTERY USING LOW OSMOLAR CONTRAST: ICD-10-PCS | Performed by: INTERNAL MEDICINE

## 2025-03-28 PROCEDURE — 4A023N7 MEASUREMENT OF CARDIAC SAMPLING AND PRESSURE, LEFT HEART, PERCUTANEOUS APPROACH: ICD-10-PCS | Performed by: INTERNAL MEDICINE

## 2025-03-28 DEVICE — STENT ONYXNG20030UX ONYX 2.00X30RX
Type: IMPLANTABLE DEVICE | Status: FUNCTIONAL
Brand: ONYX FRONTIER™

## 2025-03-28 DEVICE — STENT ONYXNG25038UX ONYX 2.50X38RX
Type: IMPLANTABLE DEVICE | Status: FUNCTIONAL
Brand: ONYX FRONTIER™

## 2025-03-28 RX ORDER — MAGNESIUM SULFATE IN WATER 40 MG/ML
2000 INJECTION, SOLUTION INTRAVENOUS PRN
OUTPATIENT
Start: 2025-03-28

## 2025-03-28 RX ORDER — METOPROLOL SUCCINATE 25 MG/1
25 TABLET, EXTENDED RELEASE ORAL DAILY
Status: CANCELLED | OUTPATIENT
Start: 2025-03-28

## 2025-03-28 RX ORDER — ACETAMINOPHEN 325 MG/1
650 TABLET ORAL EVERY 6 HOURS PRN
OUTPATIENT
Start: 2025-03-28

## 2025-03-28 RX ORDER — ROSUVASTATIN CALCIUM 20 MG/1
20 TABLET, COATED ORAL EVERY MORNING
OUTPATIENT
Start: 2025-03-28

## 2025-03-28 RX ORDER — ACETAMINOPHEN 650 MG/1
650 SUPPOSITORY RECTAL EVERY 6 HOURS PRN
OUTPATIENT
Start: 2025-03-28

## 2025-03-28 RX ORDER — LEVOTHYROXINE SODIUM 75 UG/1
75 TABLET ORAL NIGHTLY
Status: CANCELLED | OUTPATIENT
Start: 2025-03-28

## 2025-03-28 RX ORDER — FLUOXETINE 10 MG/1
10 TABLET, FILM COATED ORAL EVERY MORNING
Status: CANCELLED | OUTPATIENT
Start: 2025-03-28

## 2025-03-28 RX ORDER — MIDAZOLAM HYDROCHLORIDE 1 MG/ML
INJECTION, SOLUTION INTRAMUSCULAR; INTRAVENOUS PRN
Status: DISCONTINUED | OUTPATIENT
Start: 2025-03-28 | End: 2025-03-28 | Stop reason: HOSPADM

## 2025-03-28 RX ORDER — VIT C/B6/B5/MAGNESIUM/HERB 173 50-5-6-5MG
500 CAPSULE ORAL EVERY MORNING
Status: DISCONTINUED | OUTPATIENT
Start: 2025-03-28 | End: 2025-03-28 | Stop reason: RX

## 2025-03-28 RX ORDER — MIDAZOLAM 1 MG/ML
INJECTION INTRAMUSCULAR; INTRAVENOUS PRN
Status: DISCONTINUED | OUTPATIENT
Start: 2025-03-28 | End: 2025-03-28 | Stop reason: HOSPADM

## 2025-03-28 RX ORDER — METHOTREXATE 2.5 MG/1
15 TABLET ORAL
Status: DISCONTINUED | OUTPATIENT
Start: 2025-04-02 | End: 2025-03-29 | Stop reason: HOSPADM

## 2025-03-28 RX ORDER — PANTOPRAZOLE SODIUM 40 MG/1
40 TABLET, DELAYED RELEASE ORAL
Status: CANCELLED | OUTPATIENT
Start: 2025-03-29

## 2025-03-28 RX ORDER — ZINC SULFATE 50(220)MG
50 CAPSULE ORAL EVERY MORNING
Status: DISCONTINUED | OUTPATIENT
Start: 2025-03-28 | End: 2025-03-29 | Stop reason: HOSPADM

## 2025-03-28 RX ORDER — CLOPIDOGREL BISULFATE 75 MG/1
75 TABLET ORAL DAILY
Status: DISCONTINUED | OUTPATIENT
Start: 2025-03-29 | End: 2025-03-29 | Stop reason: HOSPADM

## 2025-03-28 RX ORDER — CLOPIDOGREL BISULFATE 75 MG/1
75 TABLET ORAL ONCE
Status: DISCONTINUED | OUTPATIENT
Start: 2025-03-28 | End: 2025-03-29 | Stop reason: HOSPADM

## 2025-03-28 RX ORDER — HEPARIN SODIUM 10000 [USP'U]/100ML
5-30 INJECTION, SOLUTION INTRAVENOUS CONTINUOUS
OUTPATIENT
Start: 2025-03-28

## 2025-03-28 RX ORDER — SODIUM CHLORIDE 9 MG/ML
INJECTION, SOLUTION INTRAVENOUS PRN
Status: DISCONTINUED | OUTPATIENT
Start: 2025-03-28 | End: 2025-03-29 | Stop reason: HOSPADM

## 2025-03-28 RX ORDER — METOPROLOL SUCCINATE 25 MG/1
25 TABLET, EXTENDED RELEASE ORAL DAILY
Status: DISCONTINUED | OUTPATIENT
Start: 2025-03-28 | End: 2025-03-28 | Stop reason: SDUPTHER

## 2025-03-28 RX ORDER — CLOPIDOGREL BISULFATE 75 MG/1
75 TABLET ORAL DAILY
OUTPATIENT
Start: 2025-03-28

## 2025-03-28 RX ORDER — LORAZEPAM 0.5 MG/1
0.5 TABLET ORAL EVERY 8 HOURS PRN
Status: DISCONTINUED | OUTPATIENT
Start: 2025-03-28 | End: 2025-03-29 | Stop reason: HOSPADM

## 2025-03-28 RX ORDER — ACETAMINOPHEN 325 MG/1
650 TABLET ORAL EVERY 4 HOURS PRN
Status: DISCONTINUED | OUTPATIENT
Start: 2025-03-28 | End: 2025-03-29 | Stop reason: HOSPADM

## 2025-03-28 RX ORDER — ASPIRIN 81 MG/1
81 TABLET, CHEWABLE ORAL DAILY
Status: DISCONTINUED | OUTPATIENT
Start: 2025-03-29 | End: 2025-03-29 | Stop reason: HOSPADM

## 2025-03-28 RX ORDER — SODIUM CHLORIDE 0.9 % (FLUSH) 0.9 %
5-40 SYRINGE (ML) INJECTION EVERY 12 HOURS SCHEDULED
OUTPATIENT
Start: 2025-03-28

## 2025-03-28 RX ORDER — POTASSIUM CHLORIDE 1500 MG/1
40 TABLET, EXTENDED RELEASE ORAL PRN
OUTPATIENT
Start: 2025-03-28

## 2025-03-28 RX ORDER — ASPIRIN 81 MG/1
81 TABLET, CHEWABLE ORAL ONCE
Status: DISCONTINUED | OUTPATIENT
Start: 2025-03-28 | End: 2025-03-29 | Stop reason: HOSPADM

## 2025-03-28 RX ORDER — HEPARIN SODIUM 1000 [USP'U]/ML
4000 INJECTION, SOLUTION INTRAVENOUS; SUBCUTANEOUS PRN
OUTPATIENT
Start: 2025-03-28

## 2025-03-28 RX ORDER — POTASSIUM CHLORIDE 7.45 MG/ML
10 INJECTION INTRAVENOUS PRN
OUTPATIENT
Start: 2025-03-28

## 2025-03-28 RX ORDER — ROSUVASTATIN CALCIUM 10 MG/1
10 TABLET, COATED ORAL EVERY MORNING
Status: DISCONTINUED | OUTPATIENT
Start: 2025-03-28 | End: 2025-03-29 | Stop reason: HOSPADM

## 2025-03-28 RX ORDER — GABAPENTIN 300 MG/1
300 CAPSULE ORAL 3 TIMES DAILY
Status: DISCONTINUED | OUTPATIENT
Start: 2025-03-28 | End: 2025-03-29 | Stop reason: HOSPADM

## 2025-03-28 RX ORDER — ONDANSETRON 2 MG/ML
4 INJECTION INTRAMUSCULAR; INTRAVENOUS EVERY 6 HOURS PRN
OUTPATIENT
Start: 2025-03-28

## 2025-03-28 RX ORDER — SODIUM CHLORIDE 0.9 % (FLUSH) 0.9 %
5-40 SYRINGE (ML) INJECTION PRN
Status: DISCONTINUED | OUTPATIENT
Start: 2025-03-28 | End: 2025-03-29 | Stop reason: HOSPADM

## 2025-03-28 RX ORDER — ONDANSETRON 4 MG/1
4 TABLET, ORALLY DISINTEGRATING ORAL EVERY 8 HOURS PRN
OUTPATIENT
Start: 2025-03-28

## 2025-03-28 RX ORDER — PANTOPRAZOLE SODIUM 40 MG/1
40 TABLET, DELAYED RELEASE ORAL
Status: DISCONTINUED | OUTPATIENT
Start: 2025-03-29 | End: 2025-03-29 | Stop reason: HOSPADM

## 2025-03-28 RX ORDER — POLYETHYLENE GLYCOL 3350 17 G/17G
17 POWDER, FOR SOLUTION ORAL DAILY PRN
OUTPATIENT
Start: 2025-03-28

## 2025-03-28 RX ORDER — SODIUM CHLORIDE 9 MG/ML
INJECTION, SOLUTION INTRAVENOUS CONTINUOUS PRN
Status: COMPLETED | OUTPATIENT
Start: 2025-03-28 | End: 2025-03-28

## 2025-03-28 RX ORDER — FLUOXETINE 10 MG/1
10 TABLET, FILM COATED ORAL EVERY MORNING
Status: DISCONTINUED | OUTPATIENT
Start: 2025-03-28 | End: 2025-03-29 | Stop reason: HOSPADM

## 2025-03-28 RX ORDER — SODIUM CHLORIDE 9 MG/ML
INJECTION, SOLUTION INTRAVENOUS PRN
OUTPATIENT
Start: 2025-03-28

## 2025-03-28 RX ORDER — IOPAMIDOL 755 MG/ML
INJECTION, SOLUTION INTRAVASCULAR PRN
Status: DISCONTINUED | OUTPATIENT
Start: 2025-03-28 | End: 2025-03-28 | Stop reason: HOSPADM

## 2025-03-28 RX ORDER — HEPARIN SODIUM 10000 [USP'U]/ML
INJECTION, SOLUTION INTRAVENOUS; SUBCUTANEOUS PRN
Status: DISCONTINUED | OUTPATIENT
Start: 2025-03-28 | End: 2025-03-28 | Stop reason: HOSPADM

## 2025-03-28 RX ORDER — CHOLECALCIFEROL (VITAMIN D3) 50 MCG
2000 TABLET ORAL EVERY MORNING
Status: DISCONTINUED | OUTPATIENT
Start: 2025-03-28 | End: 2025-03-29 | Stop reason: HOSPADM

## 2025-03-28 RX ORDER — SODIUM CHLORIDE 9 MG/ML
1000 INJECTION, SOLUTION INTRAVENOUS CONTINUOUS
Status: DISCONTINUED | OUTPATIENT
Start: 2025-03-28 | End: 2025-03-29 | Stop reason: HOSPADM

## 2025-03-28 RX ORDER — SODIUM CHLORIDE 0.9 % (FLUSH) 0.9 %
5-40 SYRINGE (ML) INJECTION PRN
OUTPATIENT
Start: 2025-03-28

## 2025-03-28 RX ORDER — ISOSORBIDE MONONITRATE 30 MG/1
30 TABLET, EXTENDED RELEASE ORAL DAILY
Status: DISCONTINUED | OUTPATIENT
Start: 2025-03-28 | End: 2025-03-29 | Stop reason: HOSPADM

## 2025-03-28 RX ORDER — FENTANYL CITRATE 50 UG/ML
INJECTION, SOLUTION INTRAMUSCULAR; INTRAVENOUS PRN
Status: DISCONTINUED | OUTPATIENT
Start: 2025-03-28 | End: 2025-03-28 | Stop reason: HOSPADM

## 2025-03-28 RX ORDER — LEUCOVORIN CALCIUM 5 MG/1
10 TABLET ORAL
Status: DISCONTINUED | OUTPATIENT
Start: 2025-03-28 | End: 2025-03-29 | Stop reason: HOSPADM

## 2025-03-28 RX ORDER — METOPROLOL SUCCINATE 25 MG/1
25 TABLET, EXTENDED RELEASE ORAL DAILY
Status: DISCONTINUED | OUTPATIENT
Start: 2025-03-28 | End: 2025-03-29 | Stop reason: HOSPADM

## 2025-03-28 RX ORDER — HEPARIN SODIUM 1000 [USP'U]/ML
2000 INJECTION, SOLUTION INTRAVENOUS; SUBCUTANEOUS PRN
OUTPATIENT
Start: 2025-03-28

## 2025-03-28 RX ORDER — NITROGLYCERIN 0.4 MG/1
0.4 TABLET SUBLINGUAL EVERY 5 MIN PRN
Status: DISCONTINUED | OUTPATIENT
Start: 2025-03-28 | End: 2025-03-29 | Stop reason: HOSPADM

## 2025-03-28 RX ORDER — FLUOXETINE 10 MG/1
10 CAPSULE ORAL EVERY MORNING
Status: CANCELLED | OUTPATIENT
Start: 2025-03-28

## 2025-03-28 RX ORDER — ASPIRIN 81 MG/1
81 TABLET, CHEWABLE ORAL DAILY
OUTPATIENT
Start: 2025-03-28

## 2025-03-28 RX ORDER — SODIUM CHLORIDE 0.9 % (FLUSH) 0.9 %
5-40 SYRINGE (ML) INJECTION EVERY 12 HOURS SCHEDULED
Status: DISCONTINUED | OUTPATIENT
Start: 2025-03-28 | End: 2025-03-29 | Stop reason: HOSPADM

## 2025-03-28 RX ORDER — PANTOPRAZOLE SODIUM 40 MG/1
40 TABLET, DELAYED RELEASE ORAL
Status: DISCONTINUED | OUTPATIENT
Start: 2025-03-29 | End: 2025-03-28 | Stop reason: SDUPTHER

## 2025-03-28 RX ORDER — GABAPENTIN 300 MG/1
300 CAPSULE ORAL 3 TIMES DAILY
OUTPATIENT
Start: 2025-03-28

## 2025-03-28 RX ADMIN — ISOSORBIDE MONONITRATE 30 MG: 30 TABLET, EXTENDED RELEASE ORAL at 15:30

## 2025-03-28 RX ADMIN — Medication 2000 UNITS: at 15:30

## 2025-03-28 RX ADMIN — SODIUM CHLORIDE, PRESERVATIVE FREE 10 ML: 5 INJECTION INTRAVENOUS at 20:53

## 2025-03-28 RX ADMIN — METOPROLOL SUCCINATE 25 MG: 25 TABLET, EXTENDED RELEASE ORAL at 15:30

## 2025-03-28 RX ADMIN — FLUOXETINE 10 MG: 10 TABLET, FILM COATED ORAL at 15:30

## 2025-03-28 RX ADMIN — ROSUVASTATIN 10 MG: 10 TABLET, FILM COATED ORAL at 15:30

## 2025-03-28 RX ADMIN — HEPARIN SODIUM 2000 UNITS: 1000 INJECTION INTRAVENOUS; SUBCUTANEOUS at 01:03

## 2025-03-28 RX ADMIN — LORAZEPAM 0.5 MG: 0.5 TABLET ORAL at 20:59

## 2025-03-28 RX ADMIN — SODIUM CHLORIDE, PRESERVATIVE FREE 10 ML: 5 INJECTION INTRAVENOUS at 20:52

## 2025-03-28 RX ADMIN — SODIUM CHLORIDE 1000 ML: 0.9 INJECTION, SOLUTION INTRAVENOUS at 11:47

## 2025-03-28 RX ADMIN — GABAPENTIN 300 MG: 300 CAPSULE ORAL at 20:53

## 2025-03-28 RX ADMIN — GABAPENTIN 300 MG: 300 CAPSULE ORAL at 15:30

## 2025-03-28 RX ADMIN — LEVOTHYROXINE SODIUM 75 MCG: 0.03 TABLET ORAL at 20:52

## 2025-03-28 RX ADMIN — ZINC SULFATE 220 MG (50 MG) CAPSULE 50 MG: CAPSULE at 15:30

## 2025-03-28 RX ADMIN — SODIUM CHLORIDE, PRESERVATIVE FREE 10 ML: 5 INJECTION INTRAVENOUS at 15:33

## 2025-03-28 ASSESSMENT — PAIN SCALES - GENERAL
PAINLEVEL_OUTOF10: 0
PAINLEVEL_OUTOF10: 0

## 2025-03-28 NOTE — PROGRESS NOTES
Stephen YATES Doctor was ordered Leucovorin which is a nonformulary medication.  This medication will need to be supplied by the patient as the pharmacy does not carry this non-formulary medication.    If the medication has not been administered by 1400 on the following day from the time the order was placed, a pharmacist will follow-up with the nurse of the patient to assess the capability of the patient to bring in the medication.    If it is determined that the patient cannot supply the medication and it is not available to be dispensed from the pharmacy, the provider will be notified.    Toy Cristobal [facility], 3/28/2025 11:39 AM

## 2025-03-28 NOTE — H&P
Internal Medicine History & Physical     Name: Stephen YATES Doctor  : 1945  Chief Complaint: No chief complaint on file.  Primary Care Physician: Stephen Pennington MD  Admission date: 3/28/2025  Date of service: 3/28/2025     History of Present Illness  Stephen is a 80 y.o. year old male who presented with a chief complaint of cardiac catheterization which he underwent 3/28/25 and had 2 stents placed. He was sent over from [facility] for this cath. He is a very nice gentleman and his wife is at bedside. His R groin cath site looks good. He denies chest pain or shortness of breath. He denies fevers chills or sweats. He has CKD and is not following with a nephrologist I have spoken to the patient and his wife and they are agreeable to see Dr Allred for op fu. Otherwise patient doing well barring set backs can likely go home tomorrow.     Past Medical History:   Diagnosis Date    Anxiety     Arthritis     rheumatoid arthritis    ASHD (arteriosclerotic heart disease)     Chronic anemia     Chronic kidney disease, stage III (moderate) ([facility])     Depression     GERD (gastroesophageal reflux disease)     Hyperlipidemia     MI (myocardial infarction) ([facility])     NSTEMI 2000    Other ulcerative colitis     BY BIOPSY    Psoriatic arthritis ([facility])     Sleep apnea        Past Surgical History:   Procedure Laterality Date    ACHILLES TENDON SURGERY Left     Dr. Navarro.  [facility]    CARDIAC CATHETERIZATION  2018    Dr. Mujica    CARDIAC SURGERY  06/15/2001    cabg x 2, WHITNEY GARCIA-MARIANA, SVG-RCA    COLONOSCOPY      CORONARY ANGIOPLASTY WITH STENT PLACEMENT  2016    LUMBAR FUSION  2015    L4-S1   DR. Walter  [facility]    MALIGNANT SKIN LESION EXCISION Left 2017    VORTEX SCALP    NECK SURGERY Left 2023    EXCISION OF BASAL CELL CARCINOMA OF LEFT LATERAL NOSE WITH RHOMBOID FLAP CLOSURE performed [place] Meh[place] Waller [place] [facility] [place]    OTHER SURGICAL HISTORY      lumbar puncture    TOTAL KNEE ARTHROPLASTY Right

## 2025-03-28 NOTE — PROGRESS NOTES
4 Eyes Skin Assessment     NAME:  Stephen Mo  YOB: 1945  MEDICAL RECORD NUMBER:  [id number]    The patient is being assessed for  Admission    I agree that at least one RN has performed a thorough Head to Toe Skin Assessment on the patient. ALL assessment sites listed below have been assessed.      Areas assessed by both nurses:    Head, Face, Ears, Shoulders, Back, Chest, Arms, Elbows, Hands, Sacrum. Buttock, Coccyx, Ischium, Legs. Feet and Heels, and Under Medical Devices         Does the Patient have a Wound? No noted wound(s)       Junior Prevention initiated by RN: No  Wound Care Orders initiated by RN: No    Pressure Injury (Stage 3,4, Unstageable, DTI, NWPT, and Complex wounds) if present, place Wound referral order by RN under : No    New Ostomies, if present place, Ostomy referral order under : No     Nurse 1 eSignature: Electronically signed by Doe Beth RN on 3/28/25 at 6:45 PM EDT    **SHARE this note so that the co-signing nurse can place an eSignature**    Nurse 2 eSignature: Electronically signed by Nevin Morales RN on 3/28/25 at 6:47 PM EDT

## 2025-03-28 NOTE — CONSULTS
[facility]  Nephrology Consult Note  Patient's Name: Stephen YATES Doctor  1:37 PM  3/28/2025    Nephrologist: none looking to establish    Reason for Consult:  mild SAI on CKD  PCP: Stephen Pennington MD  Requesting Physician: Aleks Ruiz MD     Chief Complaint:  chest pain    History Obtained From:  pt, chart    History of Present Illness:    Stephen YATES Doctor is a 80 y.o. male with past medical history of coronary artery disease status post non-STEMI in 2001, CABG in 2001, NSTEMI in 2016 status post 2 stents in RCA subsequently became occluded.  Also with history of anxiety and depression, BPH, hypothyroidism, CKD stage IIIa baseline creatinine 1.4 to 1.6 mg/dL over the past several years, acid reflux, obstructive sleep apnea, psoriatic arthritis on leflunomide and methotrexate.    Patient presented to [facility] with chest pain worsening yesterday.  Patient underwent cardiac catheterization earlier today.  He is on IV fluids.    Nephrology consult requested for CKD.  Today serum creatinine is 1.4 mg/dL.    Patient was seen and examined at bedside.  He is resting comfortably in bed.  Denies active chest pain or shortness of breath.  No nausea or vomiting.  No dizziness or lightheadedness.  No significant leg swelling.  No change in urine output.  Denies blood or bubbles in his urine.  No fevers or chills.  Remainder of review of systems are unremarkable.    Denies any significant NSAID use  BP controlled today      Family history:  None of HTN, DM, CKD    Social history: Patient does not actively smoke, no heavy alcohol consumption    Past Medical History:   Diagnosis Date    Anxiety     Arthritis     rheumatoid arthritis    ASHD (arteriosclerotic heart disease)     Chronic anemia     Chronic kidney disease, stage III (moderate) ([facility])     Depression     GERD (gastroesophageal reflux disease)     Hyperlipidemia     MI (myocardial infarction) ([facility])     NSTEMI 7/2000    Other ulcerative  Results   Component Value Date/Time    IRON 29 04/12/2014 10:05 PM     Iron Saturation:  No components found for: \"PERCENTFE\"  TIBC:    Lab Results   Component Value Date/Time    TIBC 217 04/12/2014 10:05 PM     FERRITIN:  No results found for: \"FERRITIN\"     Imaging:      Assessment/Plan:    1-SAI on CKD Stage 3a  -Baseline creatinine has been 1.4 to 1.5 mg/dL over the last 3 years  -Creatinine most recently is 1.4 mg/dL  -Previous urine studies are bland without significant microscopic hematuria although there is some scant proteinuria, we will recheck a urine sample and quantify  -Hypertension may be in setting of mild hypertension or CAD related disease as he denies NSAID use  -Continue with supportive care, given that renal function is stable at baseline limited aggressive workup planned  -His renal function is age-appropriate  -Will establish follow-up outpatient    2-BP/volume status  -Blood pressure well-controlled postcardiac cath on present regimen  -Euvolemic  -Obtain urine studies, would benefit from RAAS inhibition and/or SGLT2 inhibitor if proteinuria is present    3-Electrolyte/Acid base disorders  -Satisfactory parameters   -Monitor labs    4-CKD-MBD  -Calcium 9.9 mg/dL  -Check phosphorus, PTH, vitamin D in a.m.    5-Anemia of CKD  -Hemoglobin 13.6 g/dL, at goal  -Monitor    6-Nutrition  -albumin 4.4 g/dL, acceptable          Thnk Aleks Rodriguez MD for allowing us to participate in care of Stephen Mariee MD  1:37 PM  3/28/2025

## 2025-03-28 NOTE — H&P
For H&P see cardiology consult note by Dr. Still from 3/27/2025.  Patient with known coronary artery disease with history of CABG and coronary stents.  Presented to the [facility] with chest pain and ruled in for NSTEMI.  There was transferred this morning to [facility] for cardiac catheterization +/- PCI as indicated.  The procedure, risks, benefits and alternative therapies were explained to patient.  He understood and consented to proceed.    Electronically signed by Jovon Martin MD on 3/28/2025 at 8:59 AM

## 2025-03-28 NOTE — H&P
[place] [address]                           HISTORY & PHYSICAL      PATIENT NAME: DOCTORSTEPHEN              : 1945  MED REC NO: [id number]                        ROOM: 0437  ACCOUNT NO: [id number]                       ADMIT DATE: 2025  PROVIDER: Stephen Pennington MD      CHIEF COMPLAINT:  Chest pain.    HISTORY OF PRESENT ILLNESS:  This is an 80-year-old gentleman with known history of coronary artery disease, who presented to the emergency room with a several hour episode of chest pressure.  States he woke up yesterday morning with a feeling of squeezing or tightness across his anterior chest radiating into his left shoulder.  The patient states that this pain will come up and that persisted and ultimately had his wife bring him to the emergency room.  The patient arrived to the emergency room a couple of hours later with persistence of this ongoing chest pain.  It was associated with some mild lightheadedness.  He denied any shortness of breath, sweats, or syncope.  Ultimately, the pain was relieved in the emergency room, and he has not had any recurrence of chest pain since that time.  Presently, he is lying in bed comfortably, in no acute distress.  His initial troponin enzymes have had a steady rise from 20 to 300 to over 100, consistent with a non-ST-elevation MI.  Cardiology consultation was obtained, and the patient has been started on an IV heparin drip and left on his metoprolol.  He is scheduled this morning to be transferred downtown for a heart catheterization.  His usual cardiologist from [facility] apparently no longer come to [facility], and the patient has been seen by [facility].    PAST MEDICAL HISTORY:  Significant for atherosclerotic heart disease with a non-STEMI MI in , a bypass surgery in , non-STEMI in 2016 with 2 stents placed in the right coronary

## 2025-03-28 NOTE — PROGRESS NOTES
Pharmacy Note    Stephen YATES Doctor was ordered Adan.  As per the [facility] Formulary Committee Policy, herbals and certain dietary supplements will be discontinued.  The herbal or dietary supplement may be continued after discharge from the hospital.      Toy Cristobal [facility], 3/28/2025 11:40 AM

## 2025-03-29 VITALS
RESPIRATION RATE: 18 BRPM | HEIGHT: 72 IN | BODY MASS INDEX: 29.91 KG/M2 | OXYGEN SATURATION: 94 % | DIASTOLIC BLOOD PRESSURE: 67 MMHG | SYSTOLIC BLOOD PRESSURE: 120 MMHG | HEART RATE: 63 BPM | TEMPERATURE: 98.4 F | WEIGHT: 220.8 LBS

## 2025-03-29 PROBLEM — I25.10 CAD IN NATIVE ARTERY: Status: ACTIVE | Noted: 2025-03-29

## 2025-03-29 LAB
25(OH)D3 SERPL-MCNC: 42.9 NG/ML (ref 30–100)
ALBUMIN SERPL-MCNC: 3.9 G/DL (ref 3.5–5.2)
ALP SERPL-CCNC: 75 U/L (ref 40–129)
ALT SERPL-CCNC: 32 U/L (ref 0–40)
ANION GAP SERPL CALCULATED.3IONS-SCNC: 14 MMOL/L (ref 7–16)
AST SERPL-CCNC: 38 U/L (ref 0–39)
BILIRUB SERPL-MCNC: 0.8 MG/DL (ref 0–1.2)
BUN SERPL-MCNC: 21 MG/DL (ref 6–23)
CALCIUM SERPL-MCNC: 9.3 MG/DL (ref 8.6–10.2)
CHLORIDE SERPL-SCNC: 106 MMOL/L (ref 98–107)
CO2 SERPL-SCNC: 21 MMOL/L (ref 22–29)
CREAT SERPL-MCNC: 1.4 MG/DL (ref 0.7–1.2)
CREAT UR-MCNC: 81.8 MG/DL (ref 40–278)
GFR, ESTIMATED: 52 ML/MIN/1.73M2
GLUCOSE SERPL-MCNC: 105 MG/DL (ref 74–99)
PHOSPHATE SERPL-MCNC: 2.7 MG/DL (ref 2.5–4.5)
POTASSIUM SERPL-SCNC: 4 MMOL/L (ref 3.5–5)
PROT SERPL-MCNC: 6.4 G/DL (ref 6.4–8.3)
PTH-INTACT SERPL-MCNC: 34.5 PG/ML (ref 15–65)
SODIUM SERPL-SCNC: 141 MMOL/L (ref 132–146)
TOTAL PROTEIN, URINE: 51 MG/DL (ref 0–12)
URINE TOTAL PROTEIN CREATININE RATIO: 0.62 (ref 0–0.2)

## 2025-03-29 PROCEDURE — 1200000000 HC SEMI PRIVATE

## 2025-03-29 PROCEDURE — 6370000000 HC RX 637 (ALT 250 FOR IP): Performed by: INTERNAL MEDICINE

## 2025-03-29 PROCEDURE — 80053 COMPREHEN METABOLIC PANEL: CPT

## 2025-03-29 PROCEDURE — 84100 ASSAY OF PHOSPHORUS: CPT

## 2025-03-29 PROCEDURE — 99233 SBSQ HOSP IP/OBS HIGH 50: CPT | Performed by: INTERNAL MEDICINE

## 2025-03-29 PROCEDURE — 2500000003 HC RX 250 WO HCPCS: Performed by: INTERNAL MEDICINE

## 2025-03-29 PROCEDURE — 82306 VITAMIN D 25 HYDROXY: CPT

## 2025-03-29 PROCEDURE — 83970 ASSAY OF PARATHORMONE: CPT

## 2025-03-29 PROCEDURE — 36415 COLL VENOUS BLD VENIPUNCTURE: CPT

## 2025-03-29 PROCEDURE — G0378 HOSPITAL OBSERVATION PER HR: HCPCS

## 2025-03-29 RX ORDER — ASPIRIN 81 MG/1
81 TABLET, CHEWABLE ORAL DAILY
Qty: 30 TABLET | Refills: 3 | Status: SHIPPED | OUTPATIENT
Start: 2025-03-30

## 2025-03-29 RX ORDER — ISOSORBIDE MONONITRATE 30 MG/1
30 TABLET, EXTENDED RELEASE ORAL DAILY
Qty: 30 TABLET | Refills: 3 | Status: SHIPPED | OUTPATIENT
Start: 2025-03-29 | End: 2025-03-29 | Stop reason: HOSPADM

## 2025-03-29 RX ORDER — METOPROLOL SUCCINATE 25 MG/1
25 TABLET, EXTENDED RELEASE ORAL DAILY
Qty: 30 TABLET | Refills: 3 | Status: SHIPPED | OUTPATIENT
Start: 2025-03-30

## 2025-03-29 RX ADMIN — METOPROLOL SUCCINATE 25 MG: 25 TABLET, EXTENDED RELEASE ORAL at 09:13

## 2025-03-29 RX ADMIN — ASPIRIN 81 MG CHEWABLE TABLET 81 MG: 81 TABLET CHEWABLE at 09:13

## 2025-03-29 RX ADMIN — GABAPENTIN 300 MG: 300 CAPSULE ORAL at 09:13

## 2025-03-29 RX ADMIN — FLUOXETINE 10 MG: 10 TABLET, FILM COATED ORAL at 09:14

## 2025-03-29 RX ADMIN — PANTOPRAZOLE SODIUM 40 MG: 40 TABLET, DELAYED RELEASE ORAL at 06:29

## 2025-03-29 RX ADMIN — Medication 2000 UNITS: at 09:13

## 2025-03-29 RX ADMIN — ROSUVASTATIN 10 MG: 10 TABLET, FILM COATED ORAL at 09:13

## 2025-03-29 RX ADMIN — ZINC SULFATE 220 MG (50 MG) CAPSULE 50 MG: CAPSULE at 09:14

## 2025-03-29 RX ADMIN — CLOPIDOGREL BISULFATE 75 MG: 75 TABLET, FILM COATED ORAL at 09:13

## 2025-03-29 RX ADMIN — SODIUM CHLORIDE, PRESERVATIVE FREE 10 ML: 5 INJECTION INTRAVENOUS at 09:14

## 2025-03-29 ASSESSMENT — PAIN SCALES - GENERAL
PAINLEVEL_OUTOF10: 0
PAINLEVEL_OUTOF10: 0

## 2025-03-29 NOTE — CARE COORDINATION
3/29/2025Transition of care planning  Pt from home with spouse and had been independent. Pt pcp is Dr. Pennington and pharmacy is [facility] in [place]. Explained sw role in transition of care planning. Pt plan is home,spouse to transport.  Electronically signed by REGULO Cintron on 3/29/2025 at 10:13 AM

## 2025-03-29 NOTE — DISCHARGE SUMMARY
[address] [address]                            DISCHARGE SUMMARY      PATIENT NAME: STEPHEN ALBARRAN              : 1945  MED REC NO: [id number]                        ROOM: 0437  ACCOUNT NO: [id number]                       ADMIT DATE: 2025  PROVIDER: Stephen Pennington MD      DIAGNOSES:    1. Non-ST-elevation myocardial infarction.  2. Chronic kidney disease, stage 3B.  3. Hypertension.    COURSE OF ILLNESS:  This is an 80-year-old who presented with new onset of protracted anterior chest pain that radiated to his left shoulder.  He woke up with heaviness and tightness in his anterior chest that did not quickly resolve and ultimately prompted him to be taken to the emergency room by his wife.  In the emergency room, he received treatment and his chest pain resolved.  He had chest pain for 2 to 3 hours.  EKG did not show any acute ischemic changes.  Initial troponin enzyme was 20, but subsequently went up to 30 and then over 100.  This was felt to be consistent with acute myocardial injury.  He was seen by Cardiology who started him on IV heparin and left him on his metoprolol and planned for a heart catheterization the next morning.  While in the hospital, he did not experience any complications such as cardiac arrhythmia or any congestive heart failure symptoms.  He was discharged the next morning to [facility] to undergo heart catheterization.  He had no other complications while in the hospital.  His hospitalization will continue after his heart catheterization is completed at [facility].  He will likely stay at the [facility] for the completion of this hospitalization.  Discharge medications will be assessed and updated at that time.  The patient will require outpatient followup with Cardiology.  The patient will have a decision made as he was seen by [facility]

## 2025-03-29 NOTE — DISCHARGE INSTRUCTIONS
Cardiac Rehabilitation: Discharge instructions        Cardiac rehabilitation is a program for people who have a heart problem, such as a heart attack, coronary stent placed, heart failure, or a heart valve disease. The program includes exercise, lifestyle changes, education, and emotional support. Cardiac rehab can help you improve the quality of your life through better overall health. It can help you lose weight and feel better about yourself.    On your cardiac rehab team, you may have your doctor, a nurse specialist, an exercise physiologist, and a dietitian. They will design your cardiac rehab program specifically for you. You will learn how to reduce your risk for heart problems, how to manage stress, and how to eat a heart-healthy diet. By the end of the program, you will be ready to maintain a healthier lifestyle on your own.    Follow-up care is a key part of your treatment and safety. Be sure to make and go to all appointments, and call your doctor if you are having problems. It's also a good idea to know your test results and keep a list of the medicines you take.    Please call to schedule your first appointment once you have been cleared by your cardiologist to attend Phase II Outpatient Cardiac Rehabilitation.       Cardiac Rehabilitation Options:  [facility] Cardiac Rehab             [facility]  [address]                                                                                          Cardiology Services  [place], [place] [address]                                                                              [address]  P- [phone]                                                                                         [place] [address]  Hours: M/W/F 7am-7pm & T/Th 7am-12pm                                                  [phone]                                                                                                                           [phone]  [facility]  Cardiac Rehab  [address], [place]                                                                                                 [facility]  P- [phone]                                                                                         Cardiac Rehabilitation  Hours: M/W/F 7am-6pm                                                                                [address] [facility] [place] [address]  [facility]                                                          [phone]  Cardiac Rehabilitation                                                                                   [phone]  [address] [place] [address]                                                                                        [facility]  [phone]                                                                                          Cardiac Rehabilitation  [phone]                                                                                          [address] [facility]                                                                                                                        [place] [address]                                                                                                                        [phone]                                                                                                                        [phone]

## 2025-03-29 NOTE — PROGRESS NOTES
[facility]  Nephrology Progress Note  Patient's Name: Stephen YATES Doctor  10:29 AM  3/29/2025      History of Present Illness: From consult dated 3/28/2025  Stephen YATES Doctor is a 80 y.o. male with past medical history of coronary artery disease status post non-STEMI in 2001, CABG in 2001, NSTEMI in 2016 status post 2 stents in RCA subsequently became occluded.  Also with history of anxiety and depression, BPH, hypothyroidism, CKD stage IIIa baseline creatinine 1.4 to 1.6 mg/dL over the past several years, acid reflux, obstructive sleep apnea, psoriatic arthritis on leflunomide and methotrexate.    Patient presented to [facility] with chest pain worsening yesterday.  Patient underwent cardiac catheterization earlier today.  He is on IV fluids.    Nephrology consult requested for CKD.  Today serum creatinine is 1.4 mg/dL.    Patient was seen and examined at bedside.  He is resting comfortably in bed.  Denies active chest pain or shortness of breath.  No nausea or vomiting.  No dizziness or lightheadedness.  No significant leg swelling.  No change in urine output.  Denies blood or bubbles in his urine.  No fevers or chills.  Remainder of review of systems are unremarkable.    Denies any significant NSAID use  BP controlled today      Family history:  None of HTN, DM, CKD    Social history: Patient does not actively smoke, no heavy alcohol consumption.    3/29/25- seen and examined. He is for discharge today. Doing well.       Past Medical History:   Diagnosis Date    Anxiety     Arthritis     rheumatoid arthritis    ASHD (arteriosclerotic heart disease)     Chronic anemia     Chronic kidney disease, stage III (moderate) ([facility])     Depression     GERD (gastroesophageal reflux disease)     Hyperlipidemia     MI (myocardial infarction) ([facility])     NSTEMI 7/2000    Other ulcerative colitis     BY BIOPSY    Psoriatic arthritis ([facility])     Sleep apnea        Past Surgical History:   Procedure Laterality  92%   BMI 29.95 kg/m²   VITALS:  BP (!) 98/54   Pulse 69   Temp 97.7 °F (36.5 °C) (Temporal)   Resp 18   Ht 1.829 m (6')   Wt 100.2 kg (220 lb 12.8 oz)   SpO2 92%   BMI 29.95 kg/m²   24HR INTAKE/OUTPUT:    Intake/Output Summary (Last 24 hours) at 3/29/2025 1029  Last data filed at 3/29/2025 1008  Gross per 24 hour   Intake 1514.25 ml   Output 245 ml   Net 1269.25 ml             General: Overweight, appears stated age, not in any acute distress  HEENT: Head is normocephalic and atraumatic.  Pupils are equal and reactive to light and accommodation.  Fundus examination deferred.  Dry oral mucosa.  Neck: No JVD  Chest: Chest is symmetrical,  Lungs: Lungs clear to auscultation. No rales or ronchi.  Heart: Regular rate and rhythm. No S3 gallop. No  murmrur.  Abdomen: Soft non distended, non tender and normal bowel sounds.  Extremeties: No edema.          DATA:        No orders to display         Labs:    CBC:   Recent Labs     03/27/25  0923 03/27/25  1314 03/28/25  0610   WBC 5.9 6.1 6.3   HGB 13.5 13.3 13.6        Lab Results   Component Value Date    IRON 29 (L) 04/12/2014    TIBC 217 (L) 04/12/2014       Lab Results   Component Value Date    CALCIUM 9.3 03/29/2025    CALCIUM 9.9 03/28/2025    CALCIUM 10.1 03/27/2025    PHOS 2.7 03/29/2025    PHOS 3.0 11/06/2016    MG 1.8 11/06/2016    MG 1.7 04/13/2014       BMP:   Recent Labs     03/27/25  0923 03/28/25  0610 03/29/25  0508    144 141   K 4.4 3.9 4.0    107 106   CO2 23 23 21*   BUN 28* 23 21   CREATININE 1.5* 1.4* 1.4*   GLUCOSE 128* 126* 105*             Labs:  CBC with Differential:    Lab Results   Component Value Date/Time    WBC 6.3 03/28/2025 06:10 AM    RBC 4.03 03/28/2025 06:10 AM    HGB 13.6 03/28/2025 06:10 AM    HCT 42.6 03/28/2025 06:10 AM     04/18/2022 06:09 AM    .7 03/28/2025 06:10 AM    MCH 33.7 03/28/2025 06:10 AM    MCHC 31.9 03/28/2025 06:10 AM    RDW 14.9 03/28/2025 06:10 AM    NRBC 0.0 04/17/2022 06:27 PM

## 2025-03-29 NOTE — PLAN OF CARE
Problem: Discharge Planning  Goal: Discharge to home or other facility with appropriate resources  3/29/2025 1211 by Ernestina Muller RN  Outcome: Completed     Problem: Safety - Adult  Goal: Free from fall injury  3/29/2025 1211 by Ernestina Muller RN  Outcome: Completed     Problem: Cardiovascular - Adult  Goal: Maintains optimal cardiac output and hemodynamic stability  3/29/2025 1211 by Ernestina Muller RN  Outcome: Completed     Problem: Skin/Tissue Integrity - Adult  Goal: Incisions, wounds, or drain sites healing without S/S of infection  3/29/2025 1211 by Ernestina Muller RN  Outcome: Completed     Problem: Metabolic/Fluid and Electrolytes - Adult  Goal: Electrolytes maintained within normal limits  3/29/2025 1211 by Ernestina Muller RN  Outcome: Completed     Problem: Pain  Goal: Verbalizes/displays adequate comfort level or baseline comfort level  3/29/2025 1211 by Ernestina Muller RN  Outcome: Completed     Problem: ABCDS Injury Assessment  Goal: Absence of physical injury  3/29/2025 1211 by Ernestina Mluler RN  Outcome: Completed

## 2025-03-29 NOTE — PLAN OF CARE
Problem: Discharge Planning  Goal: Discharge to home or other facility with appropriate resources  3/29/2025 0750 by Ernestina Muller RN  Outcome: Progressing     Problem: Safety - Adult  Goal: Free from fall injury  3/29/2025 0750 by Ernestina Muller RN  Outcome: Progressing     Problem: Cardiovascular - Adult  Goal: Maintains optimal cardiac output and hemodynamic stability  3/29/2025 0750 by Ernestina Muller RN  Outcome: Progressing     Problem: Skin/Tissue Integrity - Adult  Goal: Incisions, wounds, or drain sites healing without S/S of infection  3/29/2025 0750 by Ernestina Muller RN  Outcome: Progressing     Problem: Metabolic/Fluid and Electrolytes - Adult  Goal: Electrolytes maintained within normal limits  3/29/2025 0750 by Ernestina Muller RN  Outcome: Progressing     Problem: Pain  Goal: Verbalizes/displays adequate comfort level or baseline comfort level  Outcome: Progressing     Problem: ABCDS Injury Assessment  Goal: Absence of physical injury  Outcome: Progressing

## 2025-03-29 NOTE — PROGRESS NOTES
Inpatient Cardiology Progress note     PATIENT IS BEING FOLLOWED FOR: Coronary artery disease.  NSTEMI.    Stephen Mo is a 80 y.o. male known to Dr. Jackson of [facility] cardiology and seen in initial consultation this admission by Dr. Still at the [facility] on 3/27/2025 after presenting with chest pain and ruling in for NSTEMI.  He was transferred to [facility] on 3/28/2025 for cardiac catheterization +/- PCI as indicated.    Underwent cardiac catheterization/PCI 3/28/2025 (see below)     SUBJECTIVE: Denies chest pain or shortness of breath  OBJECTIVE: Laying flat in bed in no apparent distress     ROS:  Consist: Denies fevers, chills or night sweats  Heart: Denies chest pain, palpitations, lightheadedness, dizziness or syncope  Lungs: Denies SOB, cough, wheezing, orthopnea or PND  GI: Denies abdominal pain, vomiting or diarrhea    PHYSICAL EXAM:   BP (!) 93/52   Pulse 64   Temp 97.7 °F (36.5 °C) (Temporal)   Resp 18   Ht 1.829 m (6')   Wt 100.2 kg (220 lb 12.8 oz)   SpO2 92%   BMI 29.95 kg/m²    B/P Range last 24 hours: Systolic (24hrs), Av , Min:93 , Max:153    Diastolic (24hrs), Av, Min:52, Max:99    CONST: Well developed, well nourished male who appears of stated age. Awake, alert and cooperative. No apparent distress  HEENT:   Head- Normocephalic, atraumatic   Eyes- Conjunctivae pink, anicteric  Throat- Oral mucosa pink and moist  Neck-  No stridor, trachea midline, no jugular venous distention. No carotid bruit  CHEST: Chest symmetrical and non-tender to palpation. No accessory muscle use or intercostal retractions  RESPIRATORY:  Lung sounds - clear throughout fields   CARDIOVASCULAR:     Heart Inspection- shows no noted pulsations  Heart Palpation- no heaves or thrills; PMI is non-displaced   Heart Ausculation- Regular rate and rhythm, no murmur. No s3, s4 or rub   PV: No lower extremity edema. No varicosities. Pedal pulses palpable,  no clubbing or cyanosis.  Access site right groin without hematoma and with preserved pulse  ABDOMEN: Soft, non-tender to light palpation. Bowel sounds present. No palpable masses no organomegaly; no abdominal bruit  MS: Good muscle strength and tone. No atrophy or abnormal movements.   : Deferred  SKIN: Warm and dry no statis dermatitis or ulcers   NEURO / PSYCH: Oriented to person, place and time. Speech clear and appropriate. Follows all commands. Pleasant affect       Intake/Output Summary (Last 24 hours) at 3/29/2025 0800  Last data filed at 3/28/2025 2220  Gross per 24 hour   Intake 1274.25 ml   Output 245 ml   Net 1029.25 ml       Weight:   Wt Readings from Last 3 Encounters:   03/29/25 100.2 kg (220 lb 12.8 oz)   03/27/25 99.7 kg (219 lb 12.8 oz)   06/16/24 102.1 kg (225 lb)     Current Inpatient Medications:   clopidogrel  75 mg Oral Once    aspirin  81 mg Oral Once    vitamin D  2,000 Units Oral QAM    clopidogrel  75 mg Oral Daily    gabapentin  300 mg Oral TID    leucovorin calcium  10 mg Oral Every Thursday    levothyroxine  75 mcg Oral Nightly    [START ON 4/2/2025] methotrexate  15 mg Oral Every Wednesday    rosuvastatin  10 mg Oral QAM    zinc sulfate  50 mg Oral QAM    sodium chloride flush  5-40 mL IntraVENous 2 times per day    aspirin  81 mg Oral Daily    isosorbide mononitrate  30 mg Oral Daily    metoprolol succinate  25 mg Oral Daily    FLUoxetine  10 mg Oral QAM    pantoprazole  40 mg Oral QAM AC       IV Infusions (if any):   sodium chloride      sodium chloride Stopped (03/28/25 2200)       DIAGNOSTIC/ LABORATORY DATA:  Labs:   CBC:   Recent Labs     03/27/25  1314 03/28/25  0610   WBC 6.1 6.3   HGB 13.3 13.6   HCT 41.7 42.6     BMP:   Recent Labs     03/28/25  0610 03/29/25  0508    141   K 3.9 4.0   CO2 23 21*   BUN 23 21   CREATININE 1.4* 1.4*   LABGLOM 51* 52*   CALCIUM 9.9 9.3     Mag: No results for input(s): \"MG\" in the last 72 hours.  Phos:   Recent Labs     03/29/25  0504  arthritis  Borderline low blood pressure.  History of hypertension  History of hyperlipidemia  Chronic right bundle branch block with bifascicular block  Mildly dilated ascending aorta with trace aortic regurgitation on echo 3/28/2025 (with EF 50 to 55%)  Chronic kidney disease at baseline with prior creatinines 1.4 and 1.5.  Stable kidney function post cath  History of anemia and GI bleed  Ulcerative colitis   Obstructive sleep apnea and compliant with CPAP/old granulomatous disease on CTA pulmonary this admission  Rheumatoid arthritis (patient admits to psoriatic arthritis)  Chronic back pain and history of back surgery in 2015  Hypothyroidism  GERD  History of anxiety and depression  History of a GI bleed and aspirin had been stopped in the past while on Plavix        PLAN:  1.  Discontinue Imdur.  Continue as needed sublingual nitroglycerin  2.  Rest of cardiac medications same (no room for ACE inhibitor/ARB/Entresto)  3.  Importance of compliance with DAPT emphasized  4.  May be discharged from cardiac standpoint to follow with his primary cardiologist Dr. Jackson of [facility] with cardiology  5.  Cardiology will sign off      I spent 50 minutes completing this encounter. Total time included the following:  Independently interviewing the patient (HPI, ROS, PMH, PSH, FMH, SH, allergies and medications).  Independently performing a medical appropriate examination  Ordering medications, tests and/or procedures  Formulating the assessment/plan and reviewing the rationale for the above recommendations  Reviewing available records, results of all previously ordered testing/procedures and current problem list  Counseling/educating the patient  Coordinating care with other healthcare professionals  Documenting clinical information in the patient's electronic health records    Electronically signed by Jovon Martin MD on 3/29/2025 at 8:00 AM

## 2025-03-29 NOTE — PLAN OF CARE
Problem: Discharge Planning  Goal: Discharge to home or other facility with appropriate resources  Outcome: Progressing     Problem: Safety - Adult  Goal: Free from fall injury  Outcome: Progressing     Problem: Skin/Tissue Integrity - Adult  Goal: Incisions, wounds, or drain sites healing without S/S of infection  Outcome: Progressing     Problem: Metabolic/Fluid and Electrolytes - Adult  Goal: Electrolytes maintained within normal limits  Outcome: Progressing

## 2025-03-29 NOTE — CONSULTS
Met with patient and discussed that their physician has ordered a referral to our outpatient Phase II Cardiac Rehabilitation program. Reviewed the benefits of cardiac rehabilitation based on their diagnosis and personal risk factors. Patient demonstrates moderate interest in Cardiac Rehabilitation at this time.  Cardiac Rehabilitation brochure provided to patient/family. The Cardiac Rehabilitation Program has been provided the patient's referral information and pertinent patient details and history. The patient may call [facility] Cardiac Rehabilitation at [phone] for additional information or questions. Contact information for [facility] Cardiac Rehabilitation and other choices close to the patient's residence have been provided in the discharge instructions so that the patient may call and schedule an appointment. Thank you for the referral.

## 2025-03-29 NOTE — DISCHARGE SUMMARY
Internal Medicine Discharge Summary    NAME: Stephen Mo :  1945  MRN:  [id number] PCP:Stephen Pennington MD    ADMITTED: 3/28/2025   DISCHARGED: 3/29/2025 12:43 PM    ADMITTING PHYSICIAN: Gabriella att. providers found    PCP: Stephen Pennington MD    CONSULTANT(S):   IP CONSULT TO CARDIAC REHAB  IP CONSULT TO CARDIAC REHAB  IP CONSULT TO NEPHROLOGY     ADMITTING DIAGNOSIS:   Chest pain due to myocardial ischemia, unspecified ischemic chest pain type [I25.9]  S/P angioplasty with stent [Z95.820]  CAD in native artery [I25.10]     Please see H&P for further details    DISCHARGE DIAGNOSES:   Active Hospital Problems    Diagnosis     S/P angioplasty with stent [Z95.820]      Priority: High    CAD in native artery [I25.10]        BRIEF HISTORY OF PRESENT ILLNESS: Stephen Mo is a 80 y.o. male patient of Stephen Pennington MD who  has a past medical history of Anxiety, Arthritis, ASHD (arteriosclerotic heart disease), Chronic anemia, Chronic kidney disease, stage III (moderate) ([facility]), Depression, GERD (gastroesophageal reflux disease), Hyperlipidemia, MI (myocardial infarction) ([facility]), Other ulcerative colitis, Psoriatic arthritis ([facility]), and Sleep apnea. who originally had no chief complaint listed for this encounter. at presentation on 3/28/2025, and was found to have Chest pain due to myocardial ischemia, unspecified ischemic chest pain type [I25.9]  S/P angioplasty with stent [Z95.820]  CAD in native artery [I25.10] after workup.    Please see H&P for further details.    HOSPITAL COURSE:   The patient presented to the hospital with the chief complaint of No chief complaint on file.  . The patient was admitted to the hospital.     1.  Coronary artery disease/graft disease  Left main: Raised plaque at the origin of the vessel with no significant angiographic luminal narrowing  LAD: Heavily calcified proximal vessel with 70% ostial stenosis.  The LAD is occluded at the mid vessel level.  90% ostial stenosis of a  tablet  Commonly known as: RHEUMATREX     omeprazole 20 MG delayed release capsule  Commonly known as: PRILOSEC     rosuvastatin 10 MG tablet  Commonly known as: CRESTOR     turmeric 500 MG Caps     Vitamin D3 50 MCG (2000 UT) Tabs     zinc 50 MG Tabs tablet            STOP taking these medications      cephALEXin 500 MG capsule  Commonly known as: KEFLEX     metoprolol tartrate 25 MG tablet  Commonly known as: LOPRESSOR     sildenafil 100 MG tablet  Commonly known as: VIAGRA               Where to Get Your Medications        These medications were sent to [id number] - [place], [place] - [facility] -  [phone] -  [phone]  [address] [place] [address] ROAD, [facility] [address]      Phone: [phone]   aspirin 81 MG chewable tablet  metoprolol succinate 25 MG extended release tablet         Discharge Medication List as of 3/29/2025 12:15 PM        Discharge Medication List as of 3/29/2025 12:15 PM        STOP taking these medications       isosorbide mononitrate (IMDUR) 30 MG extended release tablet Comments:   Reason for Stopping:         cephALEXin (KEFLEX) 500 MG capsule Comments:   Reason for Stopping:         sildenafil (VIAGRA) 100 MG tablet Comments:   Reason for Stopping:         LORazepam (ATIVAN) 0.5 MG tablet Comments:   Reason for Stopping:             Discharge Medication List as of 3/29/2025 12:15 PM        START taking these medications    Details   aspirin 81 MG chewable tablet Take 1 tablet by mouth daily, Disp-30 tablet, R-3Normal      metoprolol succinate (TOPROL XL) 25 MG extended release tablet Take 1 tablet by mouth daily, Disp-30 tablet, R-3Normal             INTERNAL MEDICINE FOLLOW UP/INSTRUCTIONS:  Follow-up with primary care physician within 1 week of discharge from hospital  Please review changes to pre-hospital admission medications and prescriptions for new medications upon discharge from the hospital with PCP  Please review results of labs and imaging studies with PCP  Follow-up with  consultants as directed by them   If recurrence or worsening of symptoms please call primary care physician or return to the ER immediately  Diet: No diet orders on file    Preparing for this patient's discharge, including paperwork, orders, instructions, and meeting with patient did required >35 minutes.    Electronically signed by Aleks Ruiz MD on 3/29/2025 at 4:30 PM

## 2025-03-29 NOTE — PROGRESS NOTES
RN messaged Chencho Wu about patient's BP and asking about metoprolol and Imdur orders. Per Chencho add parameters to hold Imdur for SBP<100 and hold metoprolol for SBP< 95. Order to recheck BP in one hour and reassess parameters for administration.

## 2025-03-29 NOTE — PROGRESS NOTES
Discharge order noted. RN messaged Dr. Ruiz that Dr. Martin wants the Imdur discontinued and it is currently prescribed on the discharge med rec and told him we are still waiting on renal to sign off for discharge.

## 2025-03-29 NOTE — PROGRESS NOTES
Patient out of bed at 2220  room and walk near bed.   Stood to use urinal.   R groin site stable.  No hematoma,  bleeding or edema.  Dressing to R groin remains intact.

## 2025-03-31 ENCOUNTER — TELEPHONE (OUTPATIENT)
Age: 80
End: 2025-03-31

## 2025-03-31 LAB
EKG ATRIAL RATE: 59 BPM
EKG P AXIS: 63 DEGREES
EKG P-R INTERVAL: 192 MS
EKG Q-T INTERVAL: 438 MS
EKG QRS DURATION: 146 MS
EKG QTC CALCULATION (BAZETT): 433 MS
EKG R AXIS: -26 DEGREES
EKG T AXIS: 24 DEGREES
EKG VENTRICULAR RATE: 59 BPM

## 2025-03-31 PROCEDURE — 93010 ELECTROCARDIOGRAM REPORT: CPT | Performed by: INTERNAL MEDICINE

## 2025-03-31 NOTE — TELEPHONE ENCOUNTER
Called and spoke with Aston in re: to PCI education. He reports feeling well. He is taking his medication as ordered. Education packet mailed. Instructed him to call with any further questions. Reinforced the importance of uninterrupted DAPT and femoral precautions. Pt verbalized understanding.       Patient Education:  Post PCI, Risk Factors, Recovery, Prevention for Future, Lifestyle Changes     Mailed patient Education Packet to review information relating to current diagnosis, lifestyle Modification, and medications.   The packet contains information on the follow topics as they relate to the patient specifically.    1. CAD & Coronary Stents- A&P, cardiac cath, equipment used, heart anatomy  2. HTN- what is blood pressure, normals, how to manage BP/lifestyle changes  3. HLD-cholesterol, types, where it comes from, nutrition counseling  4. Diabetes Education- discussed cardiovascular effects, packet given for Hospital Education classes included if applicable.  5. Smoking cessation- nicotine effects on body and stents, how to quit, tobacco treatment center brochure included if applicable.  6. Active lifestyle suggestions- why activity and exercise are recommended, suggestions to start, Cardiac Rehabilitation benefits  7. Healthy eating- tips to help with Blood pressure and cholesterol management, eating regular meals, alternative food choices.  8. Stress management techniques  9. Post hospital follow up visit with PCP and cardiology encouraged  10. Medication Information- aspirin, P2Y12,, statins- what they are indicated for and importance of compliance.  Patient information card given to patient to keep in wallet or pocket for record keeping on medical history, doctor names and numbers, medication list.      Letter included with explanation of packet and my contact information and encouraged to call with questions they may have about information received.    Tameka Calhoun RN  CATH/PCI/STEMI Navigator

## 2025-04-02 NOTE — PROGRESS NOTES
Physician Progress Note      PATIENT:               JONATHAN ALBARRAN #:                  [id number]  :                       1945  ADMIT DATE:       3/28/2025 7:52 AM  DISCH DATE:        3/29/2025 12:43 PM  RESPONDING  PROVIDER #:        Aleks Ruiz MD          QUERY TEXT:    Patient admitted with \"ruled in for NSTEMI\" per the H&P by Dr. Martin.   THE DC   Summary state \"Chest pain due to myocardial ischemia\" by Dr. Ruiz.  If   possible, please document in progress notes and discharge summary if you are   evaluating and /or treating any of the following:    The medical record reflects the following:  Risk Factors: CAD  Clinical Indicators: H&P dated  by Dr. Ruiz states, \" coronary artery   disease graft disease\", Progress noted dated  by Dr. Martin: states   \"coronary artery disease   Treatment: *Left Heart Cath with 2 stents placed, per the note date   d03/27.Serial Labs. ASA and Clopidogrel per the current med list on progrss   notes dated .  Options provided:  -- NSTEMI  -- CAD  -- Myocardial Ischemia only  -- NSTEMI with CAD  -- Other - I will add my own diagnosis  -- Disagree - Not applicable / Not valid  -- Disagree - Clinically unable to determine / Unknown  -- Refer to Clinical Documentation Reviewer    PROVIDER RESPONSE TEXT:    After study, NSTEMI is Confirmed.    Query created by: Marva Khan on 3/31/2025 10:21 AM      Electronically signed by:  Aleks Ruiz MD 2025 1:05 PM

## 2025-04-08 ENCOUNTER — TELEPHONE (OUTPATIENT)
Dept: CARDIOLOGY CLINIC | Age: 80
End: 2025-04-08

## 2025-04-08 NOTE — TELEPHONE ENCOUNTER
Dentist office called in pt had heart attack and wants cleaning but they are unsure. needs a return call to [phone]. Aracelis

## 2025-04-11 ENCOUNTER — TELEPHONE (OUTPATIENT)
Dept: CARDIOLOGY CLINIC | Age: 80
End: 2025-04-11

## 2025-04-11 NOTE — TELEPHONE ENCOUNTER
DR PINA SENT A REFERRAL FOR JONATHAN TO BE SEEN FOLLOWING HIS RECENT CATH BY DR PIPER.  JONATHAN SEES DR OLIVARES OF [facility] CARDIOLOGY AND PER DR PIPER HE SHOULD FOLLOW WITH DR OLIVARES. IF HE WANTS TO CHANGE WHICH DR PIPER DOES NOT RECOMMEND THEN HE SHOULD FOLLOW TOVA KIDD WHO SAW HIM INITIAL CONSULTATION AT [facility] AND WHO REFERRED HIM TO [facility] FOR CATH /PCI.  RLL      I GAVE THE ABOVE INFORMATION TO GREYSON AT DR ALCANTARA'S OFFICE. RLL

## 2025-04-16 ENCOUNTER — TELEPHONE (OUTPATIENT)
Dept: ADMINISTRATIVE | Age: 80
End: 2025-04-16

## 2025-04-16 NOTE — TELEPHONE ENCOUNTER
Tameka ([phone]) at Dr Pennington office request to schedule patient HFU with Dr. Still.     Patient can be reached at [phone]

## 2025-04-16 NOTE — TELEPHONE ENCOUNTER
We received a call from 's office to schedule a hospital f/u because they were told  with [facility] is not seeing patients. I called lauren and spoke with 's staff and they said they are definitely seeing patients and he should call the office to schedule. I notified 's office.

## 2025-04-18 ENCOUNTER — TELEPHONE (OUTPATIENT)
Dept: CARDIOLOGY CLINIC | Age: 80
End: 2025-04-18

## 2025-04-18 NOTE — TELEPHONE ENCOUNTER
PCP's office wants to schedule HFU with you, patient is established with  at [facility].please advise

## 2025-05-27 NOTE — PROGRESS NOTES
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  Dr. Lim    TOTAL KNEE ARTHROPLASTY Left 2010    Left TKA.      UPPER GASTROINTESTINAL ENDOSCOPY         Family History:  Family History   Problem Relation Age of Onset    Other Mother         sepsis    Diabetes Father     Heart Disease Father        Social History:  Social History     Socioeconomic History    Marital status:      Spouse name: Not on file    Number of children: Not on file    Years of education: Not on file    Highest education level: Not on file   Occupational History    Occupation: retired- manager     Comment: [employer]   Tobacco Use    Smoking status: Former     Current packs/day: 0.00     Average packs/day: 1 pack/day for 15.4 years (15.4 ttl pk-yrs)     Types: Cigarettes     Start date:      Quit date: 1978     Years since quittin.0    Smokeless tobacco: Never    Tobacco comments:     Patient quit smoking 44 yrs.ago.   Vaping Use    Vaping status: Never Used    Passive vaping exposure: Yes   Substance and Sexual Activity    Alcohol use: Yes     Alcohol/week: 5.0 standard drinks of alcohol     Types: 5 Shots of liquor per week     Comment: occassionally    Drug use: No    Sexual activity: Not Currently     Partners: Female   Other Topics Concern    Not on file   Social History Narrative    Not on file     Social Drivers of Health     Financial Resource Strain: Not on file   Food Insecurity: No Food Insecurity (3/29/2025)    Hunger Vital Sign     Worried About Running Out of Food in the Last Year: Never true     Ran Out of Food in the Last Year: Never true   Transportation Needs: No Transportation Needs (3/29/2025)    PRAPARE - Transportation     Lack of Transportation (Medical): No     Lack of Transportation (Non-Medical): No

## 2025-05-28 ENCOUNTER — OFFICE VISIT (OUTPATIENT)
Dept: CARDIOLOGY CLINIC | Age: 80
End: 2025-05-28

## 2025-05-28 VITALS
DIASTOLIC BLOOD PRESSURE: 63 MMHG | SYSTOLIC BLOOD PRESSURE: 122 MMHG | WEIGHT: 227 LBS | RESPIRATION RATE: 18 BRPM | HEART RATE: 60 BPM | HEIGHT: 72 IN | BODY MASS INDEX: 30.75 KG/M2

## 2025-05-28 DIAGNOSIS — I10 ESSENTIAL HYPERTENSION: Primary | Chronic | ICD-10-CM

## 2025-05-28 DIAGNOSIS — I25.83 CORONARY ARTERY DISEASE DUE TO LIPID RICH PLAQUE: ICD-10-CM

## 2025-05-28 DIAGNOSIS — I25.10 CORONARY ARTERY DISEASE DUE TO LIPID RICH PLAQUE: ICD-10-CM

## 2025-05-28 RX ORDER — LORAZEPAM 0.5 MG/1
0.5 TABLET ORAL EVERY 6 HOURS PRN
COMMUNITY
Start: 2025-05-21

## 2025-05-28 RX ORDER — ROSUVASTATIN CALCIUM 10 MG/1
20 TABLET, COATED ORAL EVERY MORNING
Status: SHIPPED | COMMUNITY
Start: 2025-05-28

## 2025-05-28 NOTE — PATIENT INSTRUCTIONS
Continue current medications. Please do not stop taking Aspirin or Plavix for any reason without speaking to our office.     Continue to remain active.

## (undated) DEVICE — CATHETER GUID 6FR DIA0.071IN SHFT NYL STD L JR 4 CRV ENH

## (undated) DEVICE — TRAY,SKIN SCRUB,DRY,W/GAUZE: Brand: MEDLINE

## (undated) DEVICE — NEEDLE HYPO 27GA L1.25IN GRY POLYPR HUB S STL REG BVL STR

## (undated) DEVICE — PAD,NON-ADHERENT,2X3,STERILE,LF,1/PK: Brand: MEDLINE

## (undated) DEVICE — CATHETER DIAG 6FR L110CM ID0.056IN TRILON VENT PGTL 145

## (undated) DEVICE — COPILOT BLEEDBACK CONTROL VALVE: Brand: COPILOT

## (undated) DEVICE — Z DISCONTINUED SHEATH INTRO 6FR L11CM 0.038IN SIL TRICSP VLV W/ GWIRE

## (undated) DEVICE — DEVICE TORQ FLRESCNT PNK FOR HEMSTAS VLV

## (undated) DEVICE — RADIFOCUS GLIDEWIRE: Brand: GLIDEWIRE

## (undated) DEVICE — STERILE POLYISOPRENE POWDER-FREE SURGICAL GLOVES: Brand: PROTEXIS

## (undated) DEVICE — CATH BLLN ANGIO 2.50X15MM NC EUPHORIA RX

## (undated) DEVICE — INFLATION DEVICE KIT: Brand: ENCORE™ 26 ADVANTAGE KIT

## (undated) DEVICE — GUIDEWIRE VASC L260CM 0.035IN J TIP L3MM PTFE FIX COR NAMIC

## (undated) DEVICE — KIT ANGIO W/ AT P65 PREM HND CTRL FOR CNTRST DEL ANGIOTOUCH

## (undated) DEVICE — CATHETER DIAG 6FR L100CM SPEC CRV STYL RCB DXTERITY

## (undated) DEVICE — KIT MFLD ISOLATN NACL CNTRST PRT TBNG SPIK W/ PRSS TRNSDUC

## (undated) DEVICE — TUBING PRSS MON L12IN PVC RIG NONEXPANDING M TO FEM CONN

## (undated) DEVICE — GUIDEWIRE WITH ICE™ HYDROPHILIC COATING: Brand: CHOICE™

## (undated) DEVICE — ANGIOPLASTY ADD-ON PACK: Brand: MEDLINE INDUSTRIES, INC.

## (undated) DEVICE — NEEDLE ANGIO 1 WALL STYL 18 GAX70 CM THN ADV

## (undated) DEVICE — CATHETER DIAG 6FR L100CM LUMN ID0.056IN JR4 CRV 0 SIDE H

## (undated) DEVICE — CATHETER GUID 6FR 0.071IN COR STD JUDKINS R 4 SIDE H MID

## (undated) DEVICE — CANNULA NSL CANN NSL L25FT TBNG AD O2 SUP SFT UC

## (undated) DEVICE — CATHETER DIAG 6FR L100CM LUMN ID0.056IN JL4 CRV 0 SIDE H

## (undated) DEVICE — GLOVE ORANGE PI 8   MSG9080

## (undated) DEVICE — GUIDEWIRE VASC L260CM DIA0.035IN PTFE MOD S STL COIL PLAT

## (undated) DEVICE — Device

## (undated) DEVICE — CLOTH SURG PREP PREOPERATIVE CHLORHEXIDINE GLUC 2% READYPREP

## (undated) DEVICE — PAD, DEFIB, ADULT, RADIOTRAN, PHYSIO, LO: Brand: MEDLINE

## (undated) DEVICE — CATH BLLN ANGIO 3X15MM NC EUPHORIA RX

## (undated) DEVICE — CATHETER ANGIOPLSTY RX 2.7 FRX139 CM 2.5X15 MM SCOREFLEX NC

## (undated) DEVICE — SURGICAL PROCEDURE KIT 2 W

## (undated) DEVICE — HEAD AND NECK: Brand: MEDLINE INDUSTRIES, INC.

## (undated) DEVICE — CATHETER COR DIAG MP MPA 5FR 100CM 2 SIDE H DXTERITY